# Patient Record
Sex: MALE | Race: WHITE | NOT HISPANIC OR LATINO | URBAN - METROPOLITAN AREA
[De-identification: names, ages, dates, MRNs, and addresses within clinical notes are randomized per-mention and may not be internally consistent; named-entity substitution may affect disease eponyms.]

---

## 2023-09-17 PROBLEM — M17.10 PATELLOFEMORAL ARTHRITIS: Status: ACTIVE | Noted: 2023-09-17

## 2023-09-17 PROBLEM — M47.816 DJD (DEGENERATIVE JOINT DISEASE), LUMBAR: Status: ACTIVE | Noted: 2023-09-17

## 2023-09-17 PROBLEM — M51.36 DDD (DEGENERATIVE DISC DISEASE), LUMBAR: Status: ACTIVE | Noted: 2023-09-17

## 2023-09-17 PROBLEM — K76.89 LIVER CYST: Status: ACTIVE | Noted: 2023-09-17

## 2023-09-17 PROBLEM — E55.9 VITAMIN D DEFICIENCY: Status: ACTIVE | Noted: 2023-09-17

## 2023-09-17 PROBLEM — M47.814 DJD (DEGENERATIVE JOINT DISEASE), THORACIC: Status: ACTIVE | Noted: 2023-09-17

## 2023-09-17 PROBLEM — I10 DIASTOLIC HYPERTENSION: Status: ACTIVE | Noted: 2023-09-17

## 2023-09-17 PROBLEM — N20.0 RENAL CALCULI: Status: ACTIVE | Noted: 2023-09-17

## 2023-09-17 PROBLEM — M50.30 DDD (DEGENERATIVE DISC DISEASE), CERVICAL: Status: ACTIVE | Noted: 2023-09-17

## 2023-09-17 PROBLEM — F41.1 GENERALIZED ANXIETY DISORDER: Status: ACTIVE | Noted: 2023-09-17

## 2023-09-17 PROBLEM — M51.369 DDD (DEGENERATIVE DISC DISEASE), LUMBAR: Status: ACTIVE | Noted: 2023-09-17

## 2023-09-17 PROBLEM — N52.9 ERECTILE DYSFUNCTION: Status: ACTIVE | Noted: 2023-09-17

## 2023-09-17 PROBLEM — D17.1 LIPOMA OF ABDOMINAL WALL: Status: ACTIVE | Noted: 2023-09-17

## 2023-09-17 PROBLEM — H93.12 LEFT-SIDED TINNITUS: Status: ACTIVE | Noted: 2023-09-17

## 2023-09-17 PROBLEM — N28.1 RENAL CYSTS, ACQUIRED, BILATERAL: Status: ACTIVE | Noted: 2023-09-17

## 2023-09-17 PROBLEM — E78.00 PURE HYPERCHOLESTEROLEMIA: Status: ACTIVE | Noted: 2023-09-17

## 2023-09-17 PROBLEM — F34.1 DYSTHYMIA: Status: ACTIVE | Noted: 2023-09-17

## 2023-09-17 PROBLEM — M17.12 PRIMARY OSTEOARTHRITIS OF LEFT KNEE: Status: ACTIVE | Noted: 2023-09-17

## 2023-09-17 PROBLEM — M43.17 SPONDYLOLISTHESIS AT L5-S1 LEVEL: Status: ACTIVE | Noted: 2023-09-17

## 2023-09-17 RX ORDER — ERGOCALCIFEROL 1.25 MG/1
50000 CAPSULE ORAL
COMMUNITY
Start: 2021-04-23 | End: 2024-01-03 | Stop reason: ALTCHOICE

## 2023-09-17 RX ORDER — NAPROXEN 500 MG/1
500 TABLET ORAL 2 TIMES DAILY
COMMUNITY
End: 2024-01-03 | Stop reason: ALTCHOICE

## 2023-09-17 RX ORDER — ONDANSETRON 4 MG/1
4 TABLET, ORALLY DISINTEGRATING ORAL EVERY 6 HOURS PRN
COMMUNITY
End: 2024-01-03 | Stop reason: ALTCHOICE

## 2023-09-17 RX ORDER — SILDENAFIL CITRATE 20 MG/1
20-60 TABLET ORAL DAILY PRN
COMMUNITY
Start: 2020-02-11

## 2023-09-17 RX ORDER — CITALOPRAM 40 MG/1
40 TABLET, FILM COATED ORAL DAILY
COMMUNITY

## 2024-01-02 PROBLEM — D17.1 LIPOMA OF ABDOMINAL WALL: Status: RESOLVED | Noted: 2023-09-17 | Resolved: 2024-01-02

## 2024-01-03 ENCOUNTER — OFFICE VISIT (OUTPATIENT)
Dept: PRIMARY CARE | Facility: CLINIC | Age: 69
End: 2024-01-03
Payer: MEDICARE

## 2024-01-03 VITALS
HEART RATE: 66 BPM | OXYGEN SATURATION: 97 % | BODY MASS INDEX: 29.41 KG/M2 | SYSTOLIC BLOOD PRESSURE: 122 MMHG | HEIGHT: 70 IN | DIASTOLIC BLOOD PRESSURE: 70 MMHG | TEMPERATURE: 98.4 F | WEIGHT: 205.4 LBS

## 2024-01-03 DIAGNOSIS — Z12.11 SCREEN FOR COLON CANCER: ICD-10-CM

## 2024-01-03 DIAGNOSIS — Z00.00 ROUTINE GENERAL MEDICAL EXAMINATION AT HEALTH CARE FACILITY: Primary | ICD-10-CM

## 2024-01-03 DIAGNOSIS — Z12.5 SCREENING FOR PROSTATE CANCER: ICD-10-CM

## 2024-01-03 DIAGNOSIS — K21.9 GERD WITHOUT ESOPHAGITIS: ICD-10-CM

## 2024-01-03 DIAGNOSIS — Z23 ENCOUNTER FOR IMMUNIZATION: ICD-10-CM

## 2024-01-03 DIAGNOSIS — E55.9 VITAMIN D DEFICIENCY: ICD-10-CM

## 2024-01-03 DIAGNOSIS — E78.00 PURE HYPERCHOLESTEROLEMIA: ICD-10-CM

## 2024-01-03 PROCEDURE — 1036F TOBACCO NON-USER: CPT | Performed by: FAMILY MEDICINE

## 2024-01-03 PROCEDURE — 99215 OFFICE O/P EST HI 40 MIN: CPT | Performed by: FAMILY MEDICINE

## 2024-01-03 PROCEDURE — 3074F SYST BP LT 130 MM HG: CPT | Performed by: FAMILY MEDICINE

## 2024-01-03 PROCEDURE — 1126F AMNT PAIN NOTED NONE PRSNT: CPT | Performed by: FAMILY MEDICINE

## 2024-01-03 PROCEDURE — G0009 ADMIN PNEUMOCOCCAL VACCINE: HCPCS | Performed by: FAMILY MEDICINE

## 2024-01-03 PROCEDURE — 1170F FXNL STATUS ASSESSED: CPT | Performed by: FAMILY MEDICINE

## 2024-01-03 PROCEDURE — G0439 PPPS, SUBSEQ VISIT: HCPCS | Performed by: FAMILY MEDICINE

## 2024-01-03 PROCEDURE — 3078F DIAST BP <80 MM HG: CPT | Performed by: FAMILY MEDICINE

## 2024-01-03 PROCEDURE — 1160F RVW MEDS BY RX/DR IN RCRD: CPT | Performed by: FAMILY MEDICINE

## 2024-01-03 PROCEDURE — 1159F MED LIST DOCD IN RCRD: CPT | Performed by: FAMILY MEDICINE

## 2024-01-03 ASSESSMENT — ACTIVITIES OF DAILY LIVING (ADL)
MANAGING FINANCES: INDEPENDENT
BATHING: INDEPENDENT
USING TELEPHONE: INDEPENDENT
USING TRANSPORTATION: INDEPENDENT
GROCERY_SHOPPING: INDEPENDENT
GROCERY SHOPPING: INDEPENDENT
JUDGMENT_ADEQUATE_SAFELY_COMPLETE_DAILY_ACTIVITIES: YES
TAKING MEDICATION: INDEPENDENT
DRESSING: INDEPENDENT
DOING_HOUSEWORK: INDEPENDENT
TOILETING: INDEPENDENT
DOING HOUSEWORK: INDEPENDENT
EATING: INDEPENDENT
PILL BOX USED: NO
FEEDING: INDEPENDENT
ADEQUATE_TO_COMPLETE_ADL: YES
NEEDS ASSISTANCE WITH FOOD: INDEPENDENT
DRESSING: INDEPENDENT
MANAGING_FINANCES: INDEPENDENT
STIL DRIVING: YES
PREPARING MEALS: INDEPENDENT
BATHING: INDEPENDENT
TAKING_MEDICATION: INDEPENDENT

## 2024-01-03 ASSESSMENT — PATIENT HEALTH QUESTIONNAIRE - PHQ9
SUM OF ALL RESPONSES TO PHQ9 QUESTIONS 1 AND 2: 0
1. LITTLE INTEREST OR PLEASURE IN DOING THINGS: NOT AT ALL
2. FEELING DOWN, DEPRESSED OR HOPELESS: NOT AT ALL

## 2024-01-03 ASSESSMENT — PAIN SCALES - GENERAL: PAINLEVEL: 0-NO PAIN

## 2024-01-03 NOTE — PATIENT INSTRUCTIONS
Here for physical.  Order for blood work.  Referral to colonoscopy and EGD.  Recommend pneumonia vaccine.      I recommend that you get the shingrix vaccine for shingles prevention. Shingles vaccination requires a 2 shots series divided by 2 to 6 months. Please get at the pharmacy.

## 2024-01-03 NOTE — PROGRESS NOTES
"Subjective   Reason for Visit: Sudeep Tamez is an 68 y.o. male here for a Medicare Wellness visit.     Past Medical, Surgical, and Family History reviewed and updated in chart.    Reviewed all medications by prescribing practitioner or clinical pharmacist (such as prescriptions, OTCs, herbal therapies and supplements) and documented in the medical record.    Still working part time.  Overall doing well.  Pt is having heart burn - requesting EGD.  Due for colonoscopy.          Patient Care Team:  Rosalio Skinner DO as PCP - General (Family Medicine)  Rosalio Skinner DO as PCP - O Medicare Advantage PCP     Review of Systems    Objective   Vitals:  /70 (BP Location: Right arm, Patient Position: Sitting, BP Cuff Size: Small adult)   Pulse 66   Temp 36.9 °C (98.4 °F)   Ht 1.778 m (5' 10\")   Wt 93.2 kg (205 lb 6.4 oz)   SpO2 97%   BMI 29.47 kg/m²       Physical Exam  Vitals reviewed.   Constitutional:       General: He is not in acute distress.  Cardiovascular:      Rate and Rhythm: Normal rate and regular rhythm.   Pulmonary:      Effort: Pulmonary effort is normal.      Breath sounds: No wheezing or rhonchi.   Musculoskeletal:      Right lower leg: No edema.      Left lower leg: No edema.   Lymphadenopathy:      Cervical: No cervical adenopathy.   Neurological:      Mental Status: He is alert.         Assessment/Plan   Problem List Items Addressed This Visit       Pure hypercholesterolemia    Vitamin D deficiency     Other Visit Diagnoses       Routine general medical examination at health care facility    -  Primary    Relevant Orders    1 Year Follow Up In Primary Care - Wellness Exam    Screen for colon cancer        Screening for prostate cancer        GERD without esophagitis        Encounter for immunization              Patient Instructions   Here for physical.  Order for blood work.  Referral to colonoscopy and EGD.  Recommend pneumonia vaccine.      I recommend that you get the shingrix " vaccine for shingles prevention. Shingles vaccination requires a 2 shots series divided by 2 to 6 months. Please get at the pharmacy.

## 2024-01-12 ENCOUNTER — LAB (OUTPATIENT)
Dept: LAB | Facility: LAB | Age: 69
End: 2024-01-12
Payer: MEDICARE

## 2024-01-12 DIAGNOSIS — Z00.00 ROUTINE GENERAL MEDICAL EXAMINATION AT HEALTH CARE FACILITY: ICD-10-CM

## 2024-01-12 DIAGNOSIS — Z12.5 SCREENING FOR PROSTATE CANCER: ICD-10-CM

## 2024-01-12 DIAGNOSIS — E55.9 VITAMIN D DEFICIENCY: ICD-10-CM

## 2024-01-12 DIAGNOSIS — E78.00 PURE HYPERCHOLESTEROLEMIA: ICD-10-CM

## 2024-01-12 DIAGNOSIS — K21.9 GERD WITHOUT ESOPHAGITIS: ICD-10-CM

## 2024-01-12 LAB
ALBUMIN SERPL BCP-MCNC: 4.3 G/DL (ref 3.4–5)
ALP SERPL-CCNC: 70 U/L (ref 33–136)
ALT SERPL W P-5'-P-CCNC: 12 U/L (ref 10–52)
ANION GAP SERPL CALC-SCNC: 12 MMOL/L (ref 10–20)
AST SERPL W P-5'-P-CCNC: 17 U/L (ref 9–39)
BASOPHILS # BLD AUTO: 0.05 X10*3/UL (ref 0–0.1)
BASOPHILS NFR BLD AUTO: 0.9 %
BILIRUB SERPL-MCNC: 0.3 MG/DL (ref 0–1.2)
BUN SERPL-MCNC: 19 MG/DL (ref 6–23)
CALCIUM SERPL-MCNC: 9.7 MG/DL (ref 8.6–10.3)
CHLORIDE SERPL-SCNC: 101 MMOL/L (ref 98–107)
CHOLEST SERPL-MCNC: 233 MG/DL (ref 0–199)
CHOLESTEROL/HDL RATIO: 4.2
CO2 SERPL-SCNC: 30 MMOL/L (ref 21–32)
CREAT SERPL-MCNC: 1.05 MG/DL (ref 0.5–1.3)
EGFRCR SERPLBLD CKD-EPI 2021: 77 ML/MIN/1.73M*2
EOSINOPHIL # BLD AUTO: 0.14 X10*3/UL (ref 0–0.7)
EOSINOPHIL NFR BLD AUTO: 2.6 %
ERYTHROCYTE [DISTWIDTH] IN BLOOD BY AUTOMATED COUNT: 12.4 % (ref 11.5–14.5)
GLUCOSE SERPL-MCNC: 90 MG/DL (ref 74–99)
HCT VFR BLD AUTO: 44.5 % (ref 41–52)
HDLC SERPL-MCNC: 54.9 MG/DL
HGB BLD-MCNC: 14.4 G/DL (ref 13.5–17.5)
IMM GRANULOCYTES # BLD AUTO: 0.02 X10*3/UL (ref 0–0.7)
IMM GRANULOCYTES NFR BLD AUTO: 0.4 % (ref 0–0.9)
LDLC SERPL CALC-MCNC: 165 MG/DL
LYMPHOCYTES # BLD AUTO: 1.73 X10*3/UL (ref 1.2–4.8)
LYMPHOCYTES NFR BLD AUTO: 32.3 %
MCH RBC QN AUTO: 30.8 PG (ref 26–34)
MCHC RBC AUTO-ENTMCNC: 32.4 G/DL (ref 32–36)
MCV RBC AUTO: 95 FL (ref 80–100)
MONOCYTES # BLD AUTO: 0.4 X10*3/UL (ref 0.1–1)
MONOCYTES NFR BLD AUTO: 7.5 %
NEUTROPHILS # BLD AUTO: 3.01 X10*3/UL (ref 1.2–7.7)
NEUTROPHILS NFR BLD AUTO: 56.3 %
NON HDL CHOLESTEROL: 178 MG/DL (ref 0–149)
NRBC BLD-RTO: 0 /100 WBCS (ref 0–0)
PLATELET # BLD AUTO: 347 X10*3/UL (ref 150–450)
POTASSIUM SERPL-SCNC: 4.4 MMOL/L (ref 3.5–5.3)
PROT SERPL-MCNC: 7.1 G/DL (ref 6.4–8.2)
RBC # BLD AUTO: 4.67 X10*6/UL (ref 4.5–5.9)
SODIUM SERPL-SCNC: 139 MMOL/L (ref 136–145)
TRIGL SERPL-MCNC: 64 MG/DL (ref 0–149)
VLDL: 13 MG/DL (ref 0–40)
WBC # BLD AUTO: 5.4 X10*3/UL (ref 4.4–11.3)

## 2024-01-12 PROCEDURE — 82306 VITAMIN D 25 HYDROXY: CPT

## 2024-01-12 PROCEDURE — G0103 PSA SCREENING: HCPCS

## 2024-01-12 PROCEDURE — 80061 LIPID PANEL: CPT

## 2024-01-12 PROCEDURE — 36415 COLL VENOUS BLD VENIPUNCTURE: CPT

## 2024-01-12 PROCEDURE — 85025 COMPLETE CBC W/AUTO DIFF WBC: CPT

## 2024-01-12 PROCEDURE — 80053 COMPREHEN METABOLIC PANEL: CPT

## 2024-01-13 LAB
25(OH)D3 SERPL-MCNC: 19 NG/ML (ref 30–100)
PSA SERPL-MCNC: 4.63 NG/ML

## 2024-01-15 DIAGNOSIS — E78.00 PURE HYPERCHOLESTEROLEMIA: ICD-10-CM

## 2024-01-15 DIAGNOSIS — E55.9 VITAMIN D DEFICIENCY: Primary | ICD-10-CM

## 2024-01-15 DIAGNOSIS — R97.20 ELEVATED PSA: ICD-10-CM

## 2024-01-15 RX ORDER — ERGOCALCIFEROL 1.25 MG/1
1.25 CAPSULE ORAL
Qty: 12 CAPSULE | Refills: 3 | Status: SHIPPED | OUTPATIENT
Start: 2024-01-15 | End: 2025-01-14

## 2024-03-28 ENCOUNTER — APPOINTMENT (OUTPATIENT)
Dept: GASTROENTEROLOGY | Facility: CLINIC | Age: 69
End: 2024-03-28
Payer: MEDICARE

## 2024-04-24 NOTE — H&P (VIEW-ONLY)
History Of Present Illness  Sudeep Tamez is a 68 y.o. male presenting to GI clinic with a chief complaint of  GERD.    Pt here to establish care, he states his last colonoscopy and EGD was . He has heartburn almost every day. He takes OTC Tums, used to give him more relief in the past. He states the heartburn is worse at night sometimes and at times he can get it from drinking water. Does not matter what he eats, he still gets heartburn. Juice gives him heartburn. The heartburn sometimes wakes him up in the middle of the night.  He does have some trigger foods for symptoms including juice, peppers. Patient had one episode of dysphagia awhile ago, a piece of steak got stuck in the middle of his throat and took awhile to go down.    He drinks a lot of coffee, especially in the morning. He was drinking decaf for awhile but switched back to regular coffee. Drinks 3-5 cups of coffee daily.     Pt denies changes in bowel habits, states Bms are regular. No blood in stool or when wiping.  He has some abdominal pain in the morning sometimes after he starts drinking his coffee. It goes away on it's own.     Social History  He reports that he has never smoked. He has never used smokeless tobacco. He reports current alcohol use. He reports that he does not currently use drugs.  He does not take NSAIDs on a regular basis      Family History  Family History   Problem Relation Name Age of Onset    Other (fell broke her hip) Mother      Mental illness Mother      Hypertension Mother      Dementia Mother      Mental illness Father      Paranoid behavior Father      Dementia Father      Other (complication from broken hip) Father      Benign prostatic hyperplasia Father      Drug abuse Sister          chemical dependency - sister passed from overdose    Other (benign ovarian tumor) Daughter      Dementia Mother's Brother      Diabetes Paternal Grandmother      Other (? in sleep thought to be MI) Paternal Grandmother       "Cancer Paternal Grandfather      Lung cancer Paternal Grandfather          (smoker)    Mental illness Sibling       The patient does have a FH of CRC. he does not have a FH of IBD    Review of Systems   Constitutional:  Negative for appetite change, chills, diaphoresis, fatigue, fever and unexpected weight change.   HENT:  Positive for trouble swallowing.    Gastrointestinal:  Positive for abdominal pain. Negative for abdominal distention, anal bleeding, blood in stool, constipation, diarrhea, nausea, rectal pain and vomiting.        See HPI   All other systems reviewed and are negative.        Physical Exam  Constitutional:       Appearance: He is obese.   HENT:      Head: Normocephalic and atraumatic.   Eyes:      Conjunctiva/sclera: Conjunctivae normal.      Pupils: Pupils are equal, round, and reactive to light.   Pulmonary:      Effort: Pulmonary effort is normal.   Abdominal:      General: Bowel sounds are normal. There is no distension.      Palpations: Abdomen is soft. There is no mass.      Tenderness: There is no abdominal tenderness. There is no guarding or rebound.      Hernia: No hernia is present.   Musculoskeletal:         General: Normal range of motion.      Cervical back: Normal range of motion.   Skin:     General: Skin is warm and dry.      Coloration: Skin is not jaundiced.   Neurological:      Mental Status: He is alert and oriented to person, place, and time. Mental status is at baseline.   Psychiatric:         Mood and Affect: Mood normal.         Behavior: Behavior normal.        Last Vital Signs  /82 (BP Location: Left arm, Patient Position: Sitting, BP Cuff Size: Adult)   Pulse 67   Temp 36.5 °C (97.7 °F) (Temporal)   Resp 15   Ht 1.778 m (5' 10\")   Wt 91.7 kg (202 lb 1.6 oz)   SpO2 97%   BMI 29.00 kg/m²      Relevant Results  Lab Results   Component Value Date    WBC 5.4 01/12/2024    HGB 14.4 01/12/2024    HCT 44.5 01/12/2024    MCV 95 01/12/2024     01/12/2024    " "  Lab Results   Component Value Date    GLUCOSE 90 01/12/2024    CALCIUM 9.7 01/12/2024     01/12/2024    K 4.4 01/12/2024    CO2 30 01/12/2024     01/12/2024    BUN 19 01/12/2024    CREATININE 1.05 01/12/2024      Lab Results   Component Value Date    ALT 12 01/12/2024    AST 17 01/12/2024    ALKPHOS 70 01/12/2024    BILITOT 0.3 01/12/2024    No results found for: \"IRON\", \"TIBC\", \"IRONSAT\", \"FERRITIN\", \"[id number]\", \"FOLATE\"    Lab Results   Component Value Date    CRP 0.3 04/22/2021    No results found for: \"LIPASE\"     EGD/COLONOSCOPY     Last EGD/Colonoscopy in 2011 per pt    IMAGING  === 09/03/15 ===  CT ABDOMEN PELVIS W WO CONTRAST  - Impression -  No urinary tract stones are identified.  No acute pathologic findings are identified.  MRI the kidneys with and without contrast is recommended to further evaluate the 1 cm hypodense lesion upper pole left kidney anteriorly. There is a probable mid renal cyst on the left as well.   Slightly irregular posterior wall urinary bladder at the base. There is clinical suspicion for possible bladder neoplasm further assessment with direct visualization may be appropriate.     Assessment/Plan   68 y.o. male presenting to GI clinic with a chief complaint of GERD, he is overdue for screening colonoscopy, has FH CRC.  Will start patient on pantoprazole daily.  EGD +/- dilation and colonoscopy with Dr. Viera or Jodee  Pantoprazole daily x3 months, ideally will wean off PPI at follow up appointment, pending EGD results  Follow up after EGD/Colonoscopy    Problem List Items Addressed This Visit    None  Visit Diagnoses       Screen for colon cancer        Relevant Orders    Colonoscopy Screening; High Risk Patient; FH CRC    GERD without esophagitis        Relevant Medications    pantoprazole (ProtoNix) 40 mg EC tablet    Other Relevant Orders    EGD            Albina Ledbetter, APRN-CNP  "

## 2024-04-24 NOTE — PROGRESS NOTES
History Of Present Illness  Sudeep Tamez is a 68 y.o. male presenting to GI clinic with a chief complaint of  GERD.    Pt here to establish care, he states his last colonoscopy and EGD was . He has heartburn almost every day. He takes OTC Tums, used to give him more relief in the past. He states the heartburn is worse at night sometimes and at times he can get it from drinking water. Does not matter what he eats, he still gets heartburn. Juice gives him heartburn. The heartburn sometimes wakes him up in the middle of the night.  He does have some trigger foods for symptoms including juice, peppers. Patient had one episode of dysphagia awhile ago, a piece of steak got stuck in the middle of his throat and took awhile to go down.    He drinks a lot of coffee, especially in the morning. He was drinking decaf for awhile but switched back to regular coffee. Drinks 3-5 cups of coffee daily.     Pt denies changes in bowel habits, states Bms are regular. No blood in stool or when wiping.  He has some abdominal pain in the morning sometimes after he starts drinking his coffee. It goes away on it's own.     Social History  He reports that he has never smoked. He has never used smokeless tobacco. He reports current alcohol use. He reports that he does not currently use drugs.  He does not take NSAIDs on a regular basis      Family History  Family History   Problem Relation Name Age of Onset    Other (fell broke her hip) Mother      Mental illness Mother      Hypertension Mother      Dementia Mother      Mental illness Father      Paranoid behavior Father      Dementia Father      Other (complication from broken hip) Father      Benign prostatic hyperplasia Father      Drug abuse Sister          chemical dependency - sister passed from overdose    Other (benign ovarian tumor) Daughter      Dementia Mother's Brother      Diabetes Paternal Grandmother      Other (? in sleep thought to be MI) Paternal Grandmother       "Cancer Paternal Grandfather      Lung cancer Paternal Grandfather          (smoker)    Mental illness Sibling       The patient does have a FH of CRC. he does not have a FH of IBD    Review of Systems   Constitutional:  Negative for appetite change, chills, diaphoresis, fatigue, fever and unexpected weight change.   HENT:  Positive for trouble swallowing.    Gastrointestinal:  Positive for abdominal pain. Negative for abdominal distention, anal bleeding, blood in stool, constipation, diarrhea, nausea, rectal pain and vomiting.        See HPI   All other systems reviewed and are negative.        Physical Exam  Constitutional:       Appearance: He is obese.   HENT:      Head: Normocephalic and atraumatic.   Eyes:      Conjunctiva/sclera: Conjunctivae normal.      Pupils: Pupils are equal, round, and reactive to light.   Pulmonary:      Effort: Pulmonary effort is normal.   Abdominal:      General: Bowel sounds are normal. There is no distension.      Palpations: Abdomen is soft. There is no mass.      Tenderness: There is no abdominal tenderness. There is no guarding or rebound.      Hernia: No hernia is present.   Musculoskeletal:         General: Normal range of motion.      Cervical back: Normal range of motion.   Skin:     General: Skin is warm and dry.      Coloration: Skin is not jaundiced.   Neurological:      Mental Status: He is alert and oriented to person, place, and time. Mental status is at baseline.   Psychiatric:         Mood and Affect: Mood normal.         Behavior: Behavior normal.        Last Vital Signs  /82 (BP Location: Left arm, Patient Position: Sitting, BP Cuff Size: Adult)   Pulse 67   Temp 36.5 °C (97.7 °F) (Temporal)   Resp 15   Ht 1.778 m (5' 10\")   Wt 91.7 kg (202 lb 1.6 oz)   SpO2 97%   BMI 29.00 kg/m²      Relevant Results  Lab Results   Component Value Date    WBC 5.4 01/12/2024    HGB 14.4 01/12/2024    HCT 44.5 01/12/2024    MCV 95 01/12/2024     01/12/2024    " "  Lab Results   Component Value Date    GLUCOSE 90 01/12/2024    CALCIUM 9.7 01/12/2024     01/12/2024    K 4.4 01/12/2024    CO2 30 01/12/2024     01/12/2024    BUN 19 01/12/2024    CREATININE 1.05 01/12/2024      Lab Results   Component Value Date    ALT 12 01/12/2024    AST 17 01/12/2024    ALKPHOS 70 01/12/2024    BILITOT 0.3 01/12/2024    No results found for: \"IRON\", \"TIBC\", \"IRONSAT\", \"FERRITIN\", \"[id number]\", \"FOLATE\"    Lab Results   Component Value Date    CRP 0.3 04/22/2021    No results found for: \"LIPASE\"     EGD/COLONOSCOPY     Last EGD/Colonoscopy in 2011 per pt    IMAGING  === 09/03/15 ===  CT ABDOMEN PELVIS W WO CONTRAST  - Impression -  No urinary tract stones are identified.  No acute pathologic findings are identified.  MRI the kidneys with and without contrast is recommended to further evaluate the 1 cm hypodense lesion upper pole left kidney anteriorly. There is a probable mid renal cyst on the left as well.   Slightly irregular posterior wall urinary bladder at the base. There is clinical suspicion for possible bladder neoplasm further assessment with direct visualization may be appropriate.     Assessment/Plan   68 y.o. male presenting to GI clinic with a chief complaint of GERD, he is overdue for screening colonoscopy, has FH CRC.  Will start patient on pantoprazole daily.  EGD +/- dilation and colonoscopy with Dr. Viera or Jodee  Pantoprazole daily x3 months, ideally will wean off PPI at follow up appointment, pending EGD results  Follow up after EGD/Colonoscopy    Problem List Items Addressed This Visit    None  Visit Diagnoses       Screen for colon cancer        Relevant Orders    Colonoscopy Screening; High Risk Patient; FH CRC    GERD without esophagitis        Relevant Medications    pantoprazole (ProtoNix) 40 mg EC tablet    Other Relevant Orders    EGD            Albina Ledbetter, APRN-CNP  "

## 2024-04-29 ENCOUNTER — OFFICE VISIT (OUTPATIENT)
Dept: GASTROENTEROLOGY | Facility: CLINIC | Age: 69
End: 2024-04-29
Payer: MEDICARE

## 2024-04-29 VITALS
SYSTOLIC BLOOD PRESSURE: 132 MMHG | HEART RATE: 67 BPM | DIASTOLIC BLOOD PRESSURE: 82 MMHG | RESPIRATION RATE: 15 BRPM | OXYGEN SATURATION: 97 % | TEMPERATURE: 97.7 F | WEIGHT: 202.1 LBS | HEIGHT: 70 IN | BODY MASS INDEX: 28.93 KG/M2

## 2024-04-29 DIAGNOSIS — Z12.11 SCREEN FOR COLON CANCER: ICD-10-CM

## 2024-04-29 DIAGNOSIS — Z12.11 SCREEN FOR COLON CANCER: Primary | ICD-10-CM

## 2024-04-29 DIAGNOSIS — K21.9 GERD WITHOUT ESOPHAGITIS: ICD-10-CM

## 2024-04-29 PROCEDURE — 1159F MED LIST DOCD IN RCRD: CPT

## 2024-04-29 PROCEDURE — 3079F DIAST BP 80-89 MM HG: CPT

## 2024-04-29 PROCEDURE — 3075F SYST BP GE 130 - 139MM HG: CPT

## 2024-04-29 PROCEDURE — 1160F RVW MEDS BY RX/DR IN RCRD: CPT

## 2024-04-29 PROCEDURE — 99204 OFFICE O/P NEW MOD 45 MIN: CPT

## 2024-04-29 PROCEDURE — 1036F TOBACCO NON-USER: CPT

## 2024-04-29 PROCEDURE — 1126F AMNT PAIN NOTED NONE PRSNT: CPT

## 2024-04-29 PROCEDURE — RXMED WILLOW AMBULATORY MEDICATION CHARGE

## 2024-04-29 RX ORDER — PANTOPRAZOLE SODIUM 40 MG/1
40 TABLET, DELAYED RELEASE ORAL DAILY
Qty: 30 TABLET | Refills: 2 | Status: SHIPPED | OUTPATIENT
Start: 2024-04-29 | End: 2024-05-20

## 2024-04-29 RX ORDER — SOD SULF/POT CHLORIDE/MAG SULF 1.479 G
TABLET ORAL
Qty: 24 TABLET | Refills: 0 | OUTPATIENT
Start: 2024-04-29 | End: 2024-05-20

## 2024-04-29 ASSESSMENT — ENCOUNTER SYMPTOMS
NAUSEA: 0
BLOOD IN STOOL: 0
VOMITING: 0
ROS GI COMMENTS: SEE HPI
FATIGUE: 0
FEVER: 0
CHILLS: 0
ABDOMINAL DISTENTION: 0
APPETITE CHANGE: 0
CONSTIPATION: 0
RECTAL PAIN: 0
UNEXPECTED WEIGHT CHANGE: 0
DIARRHEA: 0
DIAPHORESIS: 0
ANAL BLEEDING: 0
TROUBLE SWALLOWING: 1
ABDOMINAL PAIN: 1

## 2024-04-29 ASSESSMENT — PAIN SCALES - GENERAL: PAINLEVEL: 0-NO PAIN

## 2024-04-29 NOTE — PATIENT INSTRUCTIONS
Start pantoprazole once daily in the morning on an empty stomach 30 mins before breakfast  Schedule EGD/Colonoscopy    Studies have shown that lifestyle interventions are just as or MORE effective than taking medications for acid reflux.  Here are strategies to reduce acid reflux  -Maintain a healthy weight. Losing weight may be the single best way to decrease reflux.  -Stress can cause abdominal fullness or pain, and can lead to behaviors that trigger heartburn, such as overeating.  -Exercise can also help if done at least two hours after a meal.  Walking after a meal is very beneficial for GERD symptoms.  -Elevate head of bed 4 to 6 inches when sleeping  -Stay upright during and after eating  -Avoid lying down for 3 hours after meal and before bedtime  -Avoid clothing that is tight in the belly area  -Avoid spicy, fatty and fried foods; carbonated beverages, caffeine, garlic, onions; chocolate, peppermint and spearmint; acidic vegetables including peppers, tomatoes and tomato sauces; and citrus fruits  -Minimize alcohol use  -Do not use tobacco products, it will increase stomach acid production  -Avoid NSAIDs such as ibuprofen, Motrin, Advil, Aleve, naproxen, meloxicam, diclofenac, aspirin, Excedrin.  If you take NSAIDs on a regular basis, please discuss use with your primary doctor, as they may be contributing to issues that cause GERD     Please call the office if symptoms persist or worsen, or if you have questions regarding treatment.  Our office number is [phone]    Thank you,  ARANZA Anne, CNP

## 2024-05-03 ENCOUNTER — PHARMACY VISIT (OUTPATIENT)
Dept: PHARMACY | Facility: CLINIC | Age: 69
End: 2024-05-03
Payer: COMMERCIAL

## 2024-05-06 ENCOUNTER — PRE-ADMISSION TESTING (OUTPATIENT)
Dept: PREADMISSION TESTING | Facility: HOSPITAL | Age: 69
End: 2024-05-06
Payer: MEDICARE

## 2024-05-06 ENCOUNTER — ANESTHESIA EVENT (OUTPATIENT)
Dept: ANESTHESIOLOGY | Facility: HOSPITAL | Age: 69
End: 2024-05-06

## 2024-05-06 VITALS — BODY MASS INDEX: 28.7 KG/M2 | WEIGHT: 200 LBS

## 2024-05-06 ASSESSMENT — DUKE ACTIVITY SCORE INDEX (DASI)
CAN YOU DO HEAVY WORK AROUND THE HOUSE LIKE SCRUBBING FLOORS OR LIFTING AND MOVING HEAVY FURNITURE: YES
CAN YOU DO YARD WORK LIKE RAKING LEAVES, WEEDING OR PUSHING A MOWER: YES
CAN YOU TAKE CARE OF YOURSELF (EAT, DRESS, BATHE, OR USE TOILET): YES
CAN YOU CLIMB A FLIGHT OF STAIRS OR WALK UP A HILL: YES
DASI METS SCORE: 8.2
CAN YOU DO LIGHT WORK AROUND THE HOUSE LIKE DUSTING OR WASHING DISHES: YES
CAN YOU WALK INDOORS, SUCH AS AROUND YOUR HOUSE: YES
CAN YOU PARTICIPATE IN MODERATE RECREATIONAL ACTIVITIES LIKE GOLF, BOWLING, DANCING, DOUBLES TENNIS OR THROWING A BASEBALL OR FOOTBALL: NO
CAN YOU DO MODERATE WORK AROUND THE HOUSE LIKE VACUUMING, SWEEPING FLOORS OR CARRYING GROCERIES: YES
CAN YOU RUN A SHORT DISTANCE: YES
CAN YOU HAVE SEXUAL RELATIONS: YES
TOTAL_SCORE: 44.7
CAN YOU WALK A BLOCK OR TWO ON LEVEL GROUND: YES
CAN YOU PARTICIPATE IN STRENOUS SPORTS LIKE SWIMMING, SINGLES TENNIS, FOOTBALL, BASKETBALL, OR SKIING: NO

## 2024-05-06 NOTE — ANESTHESIA PREPROCEDURE EVALUATION
Patient: Sudeep Tamez    Procedure Information    Date: 05/06/24  Reason: PAT         Relevant Problems   Anesthesia (within normal limits)      Cardiac   (+) Diastolic hypertension   (+) Pure hypercholesterolemia      Pulmonary (within normal limits)      Neuro   (+) Dysthymia   (+) Generalized anxiety disorder      GI (within normal limits)      /Renal   (+) Renal calculi      Liver   (+) Liver cyst      Endocrine (within normal limits)      Hematology (within normal limits)      Musculoskeletal   (+) DDD (degenerative disc disease), cervical   (+) DDD (degenerative disc disease), lumbar   (+) DJD (degenerative joint disease), lumbar   (+) Primary osteoarthritis of left knee      HEENT (within normal limits)      ID (within normal limits)      Skin (within normal limits)      GYN (within normal limits)       Clinical information reviewed:                 Chart reviewed. No clearances ordered.    There were no vitals filed for this visit.    Past Surgical History:   Procedure Laterality Date    ANKLE SURGERY Left 2005    COLONOSCOPY      ESOPHAGOGASTRODUODENOSCOPY      LITHOTRIPSY      WISDOM TOOTH EXTRACTION       Past Medical History:   Diagnosis Date    Anxiety     Depression     GERD (gastroesophageal reflux disease)     Tinnitus     Left       Current Outpatient Medications:     calcium carbonate EX (Tums Extra Strength) 300 mg (750 mg) chewable tablet, Chew 300 mg if needed for indigestion or heartburn., Disp: , Rfl:     citalopram (CeleXA) 40 mg tablet, Take 1 tablet (40 mg) by mouth once daily., Disp: , Rfl:     ergocalciferol (Vitamin D-2) 1.25 MG (19932 UT) capsule, Take 1 capsule (1,250 mcg) by mouth 1 (one) time per week., Disp: 12 capsule, Rfl: 3    pantoprazole (ProtoNix) 40 mg EC tablet, Take 1 tablet (40 mg) by mouth once daily. Do not crush, chew, or split., Disp: 30 tablet, Rfl: 2    sildenafil (Revatio) 20 mg tablet, Take 1-3 tablets (20-60 mg) by mouth once daily as needed., Disp: , Rfl:      sod sulf-pot chloride-mag sulf (Sutab) 1.479-0.188- 0.225 gram tablet, Take 12 tabs the day before and 12 tabs the day of colonoscopy per included instructions. Take 1 tab every 1 to 2 minutes. Finish tabs and 16 oz of water within 20 mins. Finish prep per instructions., Disp: 24 tablet, Rfl: 0  Prior to Admission medications    Medication Sig Start Date End Date Taking? Authorizing Provider   calcium carbonate EX (Tums Extra Strength) 300 mg (750 mg) chewable tablet Chew 300 mg if needed for indigestion or heartburn.    Historical Provider, MD   citalopram (CeleXA) 40 mg tablet Take 1 tablet (40 mg) by mouth once daily.    Historical Provider, MD   ergocalciferol (Vitamin D-2) 1.25 MG (76226 UT) capsule Take 1 capsule (1,250 mcg) by mouth 1 (one) time per week. 1/15/24 1/14/25  Rosalio Skinner, DO   pantoprazole (ProtoNix) 40 mg EC tablet Take 1 tablet (40 mg) by mouth once daily. Do not crush, chew, or split. 4/29/24 7/28/24  ARANZA Cantu-CNP   sildenafil (Revatio) 20 mg tablet Take 1-3 tablets (20-60 mg) by mouth once daily as needed. 2/11/20   Historical Provider, MD   sod sulf-pot chloride-mag sulf (Sutab) 1.479-0.188- 0.225 gram tablet Take 12 tabs the day before and 12 tabs the day of colonoscopy per included instructions. Take 1 tab every 1 to 2 minutes. Finish tabs and 16 oz of water within 20 mins. Finish prep per instructions. 4/29/24   GALINA Cantu     Allergies   Allergen Reactions    Alprazolam Other     Fatigue     Bupropion Hcl Other     Worsened mood     Sertraline Hcl Rash     Social History     Tobacco Use    Smoking status: Never    Smokeless tobacco: Never   Substance Use Topics    Alcohol use: Yes     Comment: socially         Chemistry    Lab Results   Component Value Date/Time     01/12/2024 1157    K 4.4 01/12/2024 1157     01/12/2024 1157    CO2 30 01/12/2024 1157    BUN 19 01/12/2024 1157    CREATININE 1.05 01/12/2024 1157    Lab Results   Component  "Value Date/Time    CALCIUM 9.7 01/12/2024 1157    ALKPHOS 70 01/12/2024 1157    AST 17 01/12/2024 1157    ALT 12 01/12/2024 1157    BILITOT 0.3 01/12/2024 1157          Lab Results   Component Value Date/Time    WBC 5.4 01/12/2024 1157    HGB 14.4 01/12/2024 1157    HCT 44.5 01/12/2024 1157     01/12/2024 1157     No results found for: \"PROTIME\", \"PTT\", \"INR\"  No results found for this or any previous visit (from the past 4464 hour(s)).  No results found for this or any previous visit from the past 1095 days.        NPO Detail:  No data recorded     PHYSICAL EXAM    Anesthesia Plan  "

## 2024-05-06 NOTE — PREPROCEDURE INSTRUCTIONS
Medication List            Accurate as of May 6, 2024 11:42 AM. Always use your most recent med list.                calcium carbonate  mg (750 mg) chewable tablet  Commonly known as: Tums Extra Strength  Medication Adjustments for Surgery: Continue until night before surgery     citalopram 40 mg tablet  Commonly known as: CeleXA  Medication Adjustments for Surgery: Take morning of surgery with sip of water, no other fluids     ergocalciferol 1.25 MG (94273 UT) capsule  Commonly known as: Vitamin D-2  Take 1 capsule (1,250 mcg) by mouth 1 (one) time per week.  Medication Adjustments for Surgery: Continue until night before surgery     pantoprazole 40 mg EC tablet  Commonly known as: ProtoNix  Take 1 tablet (40 mg) by mouth once daily. Do not crush, chew, or split.  Medication Adjustments for Surgery: Continue until night before surgery     sildenafil 20 mg tablet  Commonly known as: Revatio  Medication Adjustments for Surgery: Continue until night before surgery     Sutab 1.479-0.188- 0.225 gram tablet  Generic drug: sod sulf-pot chloride-mag sulf  Take 12 tabs the day before and 12 tabs the day of colonoscopy per included instructions. Take 1 tab every 1 to 2 minutes. Finish tabs and 16 oz of water within 20 mins. Finish prep per instructions.  Medication Adjustments for Surgery: Take morning of surgery with sip of water, no other fluids                        NPO Instructions:     Follow instructions. Day before procedure - CLEAR LIQUIDS ONLY  -No dairy products, nothing red/purple.  Take first part of prep- 4pm & 5pm  Drink lots of liquids.  Day of Procedure- 5 hours before arrival - Take Second Part of Prep.     STOP DRINKING 3 HOURS PRIOR TO PROCEDURE.  Take medications as instructed.       Additional Instructions:      Review your medication instructions, take indicated medications  Wear  comfortable loose fitting clothing  All jewelry and valuables should be left at home     Park in back of hospital  by ER. Come up to Second floor-Outpt dept to check in.  Bring Photo ID and Insurance card,   You MUST have a  with you.       If you get ill at all before your procedure- CALL YOUR DOCTOR/SURGEON.  We want you in the best shape that is possible. Any sickness might lead to your procedure being delayed.       Call Outpatient dept at [phone] the night before your procedure (Friday for Monday procedure), between 1-3 pm.

## 2024-05-20 ENCOUNTER — HOSPITAL ENCOUNTER (OUTPATIENT)
Dept: GASTROENTEROLOGY | Facility: HOSPITAL | Age: 69
Setting detail: OUTPATIENT SURGERY
Discharge: HOME | End: 2024-05-20
Payer: MEDICARE

## 2024-05-20 ENCOUNTER — ANESTHESIA (OUTPATIENT)
Dept: GASTROENTEROLOGY | Facility: HOSPITAL | Age: 69
End: 2024-05-20
Payer: MEDICARE

## 2024-05-20 ENCOUNTER — ANESTHESIA EVENT (OUTPATIENT)
Dept: GASTROENTEROLOGY | Facility: HOSPITAL | Age: 69
End: 2024-05-20
Payer: MEDICARE

## 2024-05-20 VITALS
DIASTOLIC BLOOD PRESSURE: 84 MMHG | HEART RATE: 71 BPM | RESPIRATION RATE: 16 BRPM | WEIGHT: 200 LBS | HEIGHT: 70 IN | OXYGEN SATURATION: 94 % | BODY MASS INDEX: 28.63 KG/M2 | TEMPERATURE: 97.2 F | SYSTOLIC BLOOD PRESSURE: 130 MMHG

## 2024-05-20 DIAGNOSIS — K21.00 GASTROESOPHAGEAL REFLUX DISEASE WITH ESOPHAGITIS WITHOUT HEMORRHAGE: Primary | ICD-10-CM

## 2024-05-20 DIAGNOSIS — K21.9 GERD WITHOUT ESOPHAGITIS: ICD-10-CM

## 2024-05-20 DIAGNOSIS — Z12.11 SCREEN FOR COLON CANCER: ICD-10-CM

## 2024-05-20 PROCEDURE — 88305 TISSUE EXAM BY PATHOLOGIST: CPT | Mod: TC,91,GENLAB | Performed by: SURGERY

## 2024-05-20 PROCEDURE — 3700000002 HC GENERAL ANESTHESIA TIME - EACH INCREMENTAL 1 MINUTE

## 2024-05-20 PROCEDURE — 88341 IMHCHEM/IMCYTCHM EA ADD ANTB: CPT | Performed by: STUDENT IN AN ORGANIZED HEALTH CARE EDUCATION/TRAINING PROGRAM

## 2024-05-20 PROCEDURE — 43239 EGD BIOPSY SINGLE/MULTIPLE: CPT | Performed by: SURGERY

## 2024-05-20 PROCEDURE — 88342 IMHCHEM/IMCYTCHM 1ST ANTB: CPT | Performed by: STUDENT IN AN ORGANIZED HEALTH CARE EDUCATION/TRAINING PROGRAM

## 2024-05-20 PROCEDURE — G0105 COLORECTAL SCRN; HI RISK IND: HCPCS | Performed by: SURGERY

## 2024-05-20 PROCEDURE — 7100000009 HC PHASE TWO TIME - INITIAL BASE CHARGE

## 2024-05-20 PROCEDURE — 3700000001 HC GENERAL ANESTHESIA TIME - INITIAL BASE CHARGE

## 2024-05-20 PROCEDURE — 2500000004 HC RX 250 GENERAL PHARMACY W/ HCPCS (ALT 636 FOR OP/ED)

## 2024-05-20 PROCEDURE — 7100000010 HC PHASE TWO TIME - EACH INCREMENTAL 1 MINUTE

## 2024-05-20 PROCEDURE — 2500000004 HC RX 250 GENERAL PHARMACY W/ HCPCS (ALT 636 FOR OP/ED): Performed by: NURSE ANESTHETIST, CERTIFIED REGISTERED

## 2024-05-20 PROCEDURE — 45378 DIAGNOSTIC COLONOSCOPY: CPT | Performed by: SURGERY

## 2024-05-20 PROCEDURE — 88305 TISSUE EXAM BY PATHOLOGIST: CPT | Performed by: STUDENT IN AN ORGANIZED HEALTH CARE EDUCATION/TRAINING PROGRAM

## 2024-05-20 PROCEDURE — 88312 SPECIAL STAINS GROUP 1: CPT | Performed by: STUDENT IN AN ORGANIZED HEALTH CARE EDUCATION/TRAINING PROGRAM

## 2024-05-20 RX ORDER — PANTOPRAZOLE SODIUM 40 MG/1
40 TABLET, DELAYED RELEASE ORAL 2 TIMES DAILY
Qty: 60 TABLET | Refills: 3 | Status: SHIPPED | OUTPATIENT
Start: 2024-05-20

## 2024-05-20 RX ORDER — SUCRALFATE 1 G/1
1 TABLET ORAL
Qty: 120 TABLET | Refills: 2 | Status: SHIPPED | OUTPATIENT
Start: 2024-05-20

## 2024-05-20 RX ORDER — FENTANYL CITRATE 50 UG/ML
INJECTION, SOLUTION INTRAMUSCULAR; INTRAVENOUS AS NEEDED
Status: DISCONTINUED | OUTPATIENT
Start: 2024-05-20 | End: 2024-05-20

## 2024-05-20 RX ORDER — PROPOFOL 10 MG/ML
INJECTION, EMULSION INTRAVENOUS AS NEEDED
Status: DISCONTINUED | OUTPATIENT
Start: 2024-05-20 | End: 2024-05-20

## 2024-05-20 RX ORDER — SODIUM CHLORIDE, SODIUM LACTATE, POTASSIUM CHLORIDE, CALCIUM CHLORIDE 600; 310; 30; 20 MG/100ML; MG/100ML; MG/100ML; MG/100ML
20 INJECTION, SOLUTION INTRAVENOUS CONTINUOUS
Status: DISCONTINUED | OUTPATIENT
Start: 2024-05-20 | End: 2024-05-21 | Stop reason: HOSPADM

## 2024-05-20 RX ADMIN — FENTANYL CITRATE 25 MCG: 50 INJECTION, SOLUTION INTRAMUSCULAR; INTRAVENOUS at 10:22

## 2024-05-20 RX ADMIN — PROPOFOL 50 MG: 10 INJECTION, EMULSION INTRAVENOUS at 10:12

## 2024-05-20 RX ADMIN — PROPOFOL 50 MG: 10 INJECTION, EMULSION INTRAVENOUS at 10:26

## 2024-05-20 RX ADMIN — PROPOFOL 50 MG: 10 INJECTION, EMULSION INTRAVENOUS at 10:20

## 2024-05-20 RX ADMIN — SODIUM CHLORIDE, SODIUM LACTATE, POTASSIUM CHLORIDE, AND CALCIUM CHLORIDE: 600; 310; 30; 20 INJECTION, SOLUTION INTRAVENOUS at 09:50

## 2024-05-20 RX ADMIN — PROPOFOL 50 MG: 10 INJECTION, EMULSION INTRAVENOUS at 10:16

## 2024-05-20 RX ADMIN — PROPOFOL 100 MG: 10 INJECTION, EMULSION INTRAVENOUS at 09:55

## 2024-05-20 RX ADMIN — FENTANYL CITRATE 50 MCG: 50 INJECTION, SOLUTION INTRAMUSCULAR; INTRAVENOUS at 09:55

## 2024-05-20 RX ADMIN — PROPOFOL 50 MG: 10 INJECTION, EMULSION INTRAVENOUS at 10:00

## 2024-05-20 RX ADMIN — PROPOFOL 50 MG: 10 INJECTION, EMULSION INTRAVENOUS at 10:14

## 2024-05-20 RX ADMIN — SODIUM CHLORIDE, POTASSIUM CHLORIDE, SODIUM LACTATE AND CALCIUM CHLORIDE 20 ML/HR: 600; 310; 30; 20 INJECTION, SOLUTION INTRAVENOUS at 09:32

## 2024-05-20 RX ADMIN — PROPOFOL 50 MG: 10 INJECTION, EMULSION INTRAVENOUS at 10:04

## 2024-05-20 RX ADMIN — PROPOFOL 50 MG: 10 INJECTION, EMULSION INTRAVENOUS at 10:23

## 2024-05-20 RX ADMIN — FENTANYL CITRATE 25 MCG: 50 INJECTION, SOLUTION INTRAMUSCULAR; INTRAVENOUS at 10:00

## 2024-05-20 RX ADMIN — PROPOFOL 50 MG: 10 INJECTION, EMULSION INTRAVENOUS at 09:57

## 2024-05-20 RX ADMIN — PROPOFOL 50 MG: 10 INJECTION, EMULSION INTRAVENOUS at 10:09

## 2024-05-20 RX ADMIN — PROPOFOL 50 MG: 10 INJECTION, EMULSION INTRAVENOUS at 10:06

## 2024-05-20 SDOH — HEALTH STABILITY: MENTAL HEALTH: CURRENT SMOKER: 0

## 2024-05-20 ASSESSMENT — PAIN - FUNCTIONAL ASSESSMENT
PAIN_FUNCTIONAL_ASSESSMENT: 0-10
PAIN_FUNCTIONAL_ASSESSMENT: UNABLE TO SELF-REPORT

## 2024-05-20 ASSESSMENT — COLUMBIA-SUICIDE SEVERITY RATING SCALE - C-SSRS
6. HAVE YOU EVER DONE ANYTHING, STARTED TO DO ANYTHING, OR PREPARED TO DO ANYTHING TO END YOUR LIFE?: NO
1. IN THE PAST MONTH, HAVE YOU WISHED YOU WERE DEAD OR WISHED YOU COULD GO TO SLEEP AND NOT WAKE UP?: NO
2. HAVE YOU ACTUALLY HAD ANY THOUGHTS OF KILLING YOURSELF?: NO

## 2024-05-20 ASSESSMENT — PAIN SCALES - GENERAL
PAINLEVEL_OUTOF10: 2
PAINLEVEL_OUTOF10: 0 - NO PAIN

## 2024-05-20 NOTE — ANESTHESIA PREPROCEDURE EVALUATION
Patient: Sudeep Tamez    Procedure Information       Date/Time: 05/20/24 0935    Scheduled providers: José Miguel Viera MD    Procedures:       COLONOSCOPY      EGD    Location: [facility]            Relevant Problems   Cardiac   (+) Diastolic hypertension   (+) Pure hypercholesterolemia      Neuro   (+) Dysthymia   (+) Generalized anxiety disorder      /Renal   (+) Renal calculi      Liver   (+) Liver cyst      Musculoskeletal   (+) DDD (degenerative disc disease), cervical   (+) DDD (degenerative disc disease), lumbar   (+) DJD (degenerative joint disease), lumbar   (+) Primary osteoarthritis of left knee       Clinical information reviewed:   Tobacco  Allergies  Meds   Med Hx  Surg Hx   Fam Hx  Soc Hx        NPO Detail:  NPO/Void Status  Carbohydrate Drink Given Prior to Surgery? : N  Date of Last Liquid: 05/20/24  Time of Last Liquid: 0730  Date of Last Solid: 05/18/24  Time of Last Solid: 2100  Last Intake Type: Clear fluids  Time of Last Void: 0900         Physical Exam    Airway  Mallampati: II  TM distance: >3 FB  Neck ROM: full     Cardiovascular   Rhythm: regular  Rate: normal     Dental    Pulmonary   Breath sounds clear to auscultation     Abdominal   Abdomen: soft           Vitals:    05/20/24 0928   BP: 138/75   Resp: 16   Temp: 36.1 °C (97 °F)   SpO2: 96%       Past Surgical History:   Procedure Laterality Date    ANKLE SURGERY Left 2005    COLONOSCOPY      ESOPHAGOGASTRODUODENOSCOPY      LITHOTRIPSY      WISDOM TOOTH EXTRACTION       Past Medical History:   Diagnosis Date    Anxiety     Depression     GERD (gastroesophageal reflux disease)     Tinnitus     Left       Current Outpatient Medications:     calcium carbonate EX (Tums Extra Strength) 300 mg (750 mg) chewable tablet, Chew 300 mg if needed for indigestion or heartburn., Disp: , Rfl:     citalopram (CeleXA) 40 mg tablet, Take 1 tablet (40 mg) by mouth once daily., Disp: , Rfl:     ergocalciferol (Vitamin D-2) 1.25 MG  (34472 UT) capsule, Take 1 capsule (1,250 mcg) by mouth 1 (one) time per week., Disp: 12 capsule, Rfl: 3    pantoprazole (ProtoNix) 40 mg EC tablet, Take 1 tablet (40 mg) by mouth once daily. Do not crush, chew, or split., Disp: 30 tablet, Rfl: 2    sod sulf-pot chloride-mag sulf (Sutab) 1.479-0.188- 0.225 gram tablet, Take 12 tabs the day before and 12 tabs the day of colonoscopy per included instructions. Take 1 tab every 1 to 2 minutes. Finish tabs and 16 oz of water within 20 mins. Finish prep per instructions., Disp: 24 tablet, Rfl: 0    sildenafil (Revatio) 20 mg tablet, Take 1-3 tablets (20-60 mg) by mouth once daily as needed., Disp: , Rfl:     Current Facility-Administered Medications:     lactated Ringer's infusion, 20 mL/hr, intravenous, Continuous, Jaylyn Johnson PA-C, Last Rate: 20 mL/hr at 05/20/24 0932, 20 mL/hr at 05/20/24 0932  Prior to Admission medications    Medication Sig Start Date End Date Taking? Authorizing Provider   calcium carbonate EX (Tums Extra Strength) 300 mg (750 mg) chewable tablet Chew 300 mg if needed for indigestion or heartburn.   Yes Historical Provider, MD   citalopram (CeleXA) 40 mg tablet Take 1 tablet (40 mg) by mouth once daily.   Yes Historical Provider, MD   ergocalciferol (Vitamin D-2) 1.25 MG (57755 UT) capsule Take 1 capsule (1,250 mcg) by mouth 1 (one) time per week. 1/15/24 1/14/25 Yes Rosalio Skinner, DO   pantoprazole (ProtoNix) 40 mg EC tablet Take 1 tablet (40 mg) by mouth once daily. Do not crush, chew, or split. 4/29/24 7/28/24 Yes Albina Ledbetter, APRN-CNP   sod sulf-pot chloride-mag sulf (Sutab) 1.479-0.188- 0.225 gram tablet Take 12 tabs the day before and 12 tabs the day of colonoscopy per included instructions. Take 1 tab every 1 to 2 minutes. Finish tabs and 16 oz of water within 20 mins. Finish prep per instructions. 4/29/24  Yes ARANZA Cantu-CNP   sildenafil (Revatio) 20 mg tablet Take 1-3 tablets (20-60 mg) by mouth once daily as  "needed. 2/11/20   Historical Provider, MD     Allergies   Allergen Reactions    Alprazolam Other     Fatigue     Bupropion Hcl Other     Worsened mood     Sertraline Hcl Rash     Social History     Tobacco Use    Smoking status: Never    Smokeless tobacco: Never   Substance Use Topics    Alcohol use: Yes     Comment: socially         Chemistry    Lab Results   Component Value Date/Time     01/12/2024 1157    K 4.4 01/12/2024 1157     01/12/2024 1157    CO2 30 01/12/2024 1157    BUN 19 01/12/2024 1157    CREATININE 1.05 01/12/2024 1157    Lab Results   Component Value Date/Time    CALCIUM 9.7 01/12/2024 1157    ALKPHOS 70 01/12/2024 1157    AST 17 01/12/2024 1157    ALT 12 01/12/2024 1157    BILITOT 0.3 01/12/2024 1157          Lab Results   Component Value Date/Time    WBC 5.4 01/12/2024 1157    HGB 14.4 01/12/2024 1157    HCT 44.5 01/12/2024 1157     01/12/2024 1157     No results found for: \"PROTIME\", \"PTT\", \"INR\"  No results found for this or any previous visit (from the past 4464 hour(s)).  No results found for this or any previous visit from the past 1095 days.     Anesthesia Plan    History of general anesthesia?: yes  History of complications of general anesthesia?: no    ASA 2     MAC     The patient is not a current smoker.    intravenous induction   Anesthetic plan and risks discussed with patient.  Use of blood products discussed with patient who consented to blood products.    Plan discussed with attending.      "

## 2024-05-20 NOTE — ANESTHESIA POSTPROCEDURE EVALUATION
Patient: Sudeep Tamez    Procedure Summary       Date: 05/20/24 Room / Location: [facility]    Anesthesia Start: 0950 Anesthesia Stop: 1040    Procedures:       COLONOSCOPY      EGD Diagnosis:       Screen for colon cancer      GERD without esophagitis    Scheduled Providers: José Miguel Viera MD Responsible Provider: KADI Tabor    Anesthesia Type: MAC ASA Status: 2            Anesthesia Type: MAC    Vitals Value Taken Time   /84 05/20/24 1056   Temp 36 °C (96.8 °F) 05/20/24 1033   Pulse 71 05/20/24 1056   Resp 16 05/20/24 1056   SpO2 94 % 05/20/24 1056       Anesthesia Post Evaluation    Patient participation: complete - patient participated  Level of consciousness: awake and alert  Pain management: adequate  Airway patency: patent  Cardiovascular status: acceptable  Respiratory status: acceptable  Hydration status: acceptable  Postoperative Nausea and Vomiting: none        No notable events documented.

## 2024-05-21 ASSESSMENT — PAIN SCALES - GENERAL: PAINLEVEL_OUTOF10: 0 - NO PAIN

## 2024-06-04 LAB
LAB AP ASR DISCLAIMER: NORMAL
LABORATORY COMMENT REPORT: NORMAL
PATH REPORT.FINAL DX SPEC: NORMAL
PATH REPORT.GROSS SPEC: NORMAL
PATH REPORT.TOTAL CANCER: NORMAL

## 2024-06-06 ENCOUNTER — TELEPHONE (OUTPATIENT)
Dept: SURGERY | Facility: CLINIC | Age: 69
End: 2024-06-06
Payer: MEDICARE

## 2024-06-06 NOTE — TELEPHONE ENCOUNTER
This RN attempted to call patient to go over results that were sent to her by Dr. Viera. This RN left voicemail for patient to call bariatric office in order to go over the results. The contact information was provided for the patient.

## 2024-06-11 ENCOUNTER — OFFICE VISIT (OUTPATIENT)
Dept: GASTROENTEROLOGY | Facility: CLINIC | Age: 69
End: 2024-06-11
Payer: MEDICARE

## 2024-06-11 VITALS
WEIGHT: 198 LBS | SYSTOLIC BLOOD PRESSURE: 160 MMHG | HEIGHT: 70 IN | BODY MASS INDEX: 28.35 KG/M2 | TEMPERATURE: 97.3 F | RESPIRATION RATE: 16 BRPM | OXYGEN SATURATION: 97 % | HEART RATE: 97 BPM | DIASTOLIC BLOOD PRESSURE: 78 MMHG

## 2024-06-11 DIAGNOSIS — K22.70 BARRETT'S ESOPHAGUS WITHOUT DYSPLASIA: Primary | ICD-10-CM

## 2024-06-11 DIAGNOSIS — K21.9 GERD WITHOUT ESOPHAGITIS: ICD-10-CM

## 2024-06-11 PROCEDURE — 1159F MED LIST DOCD IN RCRD: CPT

## 2024-06-11 PROCEDURE — 3077F SYST BP >= 140 MM HG: CPT

## 2024-06-11 PROCEDURE — 99213 OFFICE O/P EST LOW 20 MIN: CPT

## 2024-06-11 PROCEDURE — 1036F TOBACCO NON-USER: CPT

## 2024-06-11 PROCEDURE — 1126F AMNT PAIN NOTED NONE PRSNT: CPT

## 2024-06-11 PROCEDURE — 3078F DIAST BP <80 MM HG: CPT

## 2024-06-11 ASSESSMENT — PATIENT HEALTH QUESTIONNAIRE - PHQ9
SUM OF ALL RESPONSES TO PHQ9 QUESTIONS 1 AND 2: 0
2. FEELING DOWN, DEPRESSED OR HOPELESS: NOT AT ALL
1. LITTLE INTEREST OR PLEASURE IN DOING THINGS: NOT AT ALL

## 2024-06-11 ASSESSMENT — ENCOUNTER SYMPTOMS: ROS GI COMMENTS: SEE HPI

## 2024-06-11 ASSESSMENT — PAIN SCALES - GENERAL: PAINLEVEL: 0-NO PAIN

## 2024-06-11 NOTE — PROGRESS NOTES
History Of Present Illness  Sudeep Tamez is a 68 y.o. male presenting to GI clinic for follow up.  He was last seen in 2024 with GERD complaints and dysphagia x1.  EGD and colonoscopy were ordered and performed on 2024 with Dr. Viera.  Today, he feels that he is doing better.  He no longer needs to use Tums and his acid reflux has significantly improved.  He does have occasional regurgitation for few minutes when laying down for bed at night.  Denies constipation, BRBPR, melena.    Social History  He reports that he has never smoked. He has never used smokeless tobacco. He reports current alcohol use. He reports that he does not currently use drugs.  He does not take NSAIDs on a regular basis    Family History  Family History   Problem Relation Name Age of Onset    Other (fell broke her hip) Mother      Mental illness Mother      Hypertension Mother      Dementia Mother      Mental illness Father      Paranoid behavior Father      Dementia Father      Other (complication from broken hip) Father      Benign prostatic hyperplasia Father      Drug abuse Sister          chemical dependency - sister passed from overdose    Other (benign ovarian tumor) Daughter      Dementia Mother's Brother Fei     Colon cancer Mother's Brother Fei     Colon cancer Maternal Grandmother      Diabetes Paternal Grandmother      Other (? in sleep thought to be MI) Paternal Grandmother      Cancer Paternal Grandfather      Lung cancer Paternal Grandfather          (smoker)    Mental illness Sibling       The patient does have a FH of CRC. he does not have a FH of IBD    Review of Systems   Gastrointestinal:         See HPI   All other systems reviewed and are negative.        Physical Exam  Constitutional:       Appearance: He is overweight.   HENT:      Head: Normocephalic and atraumatic.   Eyes:      Conjunctiva/sclera: Conjunctivae normal.      Pupils: Pupils are equal, round, and reactive to light.   Pulmonary:       "Effort: Pulmonary effort is normal.   Abdominal:      General: Bowel sounds are normal. There is no distension.      Palpations: Abdomen is soft. There is no mass.      Tenderness: There is no abdominal tenderness. There is no guarding or rebound.      Hernia: No hernia is present.   Musculoskeletal:         General: Normal range of motion.      Cervical back: Normal range of motion.   Skin:     General: Skin is warm and dry.      Coloration: Skin is not jaundiced.   Neurological:      Mental Status: He is alert and oriented to person, place, and time. Mental status is at baseline.   Psychiatric:         Mood and Affect: Mood normal.         Behavior: Behavior normal.          Last Vital Signs  Resp 16   Ht 1.778 m (5' 10\")   Wt 89.8 kg (198 lb)   BMI 28.41 kg/m²      Relevant Results  Lab Results   Component Value Date    WBC 5.4 01/12/2024    HGB 14.4 01/12/2024    HCT 44.5 01/12/2024    MCV 95 01/12/2024     01/12/2024      Lab Results   Component Value Date    GLUCOSE 90 01/12/2024    CALCIUM 9.7 01/12/2024     01/12/2024    K 4.4 01/12/2024    CO2 30 01/12/2024     01/12/2024    BUN 19 01/12/2024    CREATININE 1.05 01/12/2024      Lab Results   Component Value Date    ALT 12 01/12/2024    AST 17 01/12/2024    ALKPHOS 70 01/12/2024    BILITOT 0.3 01/12/2024    No results found for: \"IRON\", \"TIBC\", \"IRONSAT\", \"FERRITIN\", \"DYNBQYPM02\", \"FOLATE\"    Lab Results   Component Value Date    CRP 0.3 04/22/2021    No results found for: \"LIPASE\"   Lab Results   Component Value Date    HGBA1C 5.6 02/11/2020        EGD/COLONOSCOPY  EGD 5/20/24 with Dr. Viera- Findings  Severe, localized edematous, erythematous, friable, granular and ulcerated mucosa with erosion measuring 40 mm in the lower third of the esophagus and GE junction (32 cm from the incisors), consistent with Stubbs's esophagus; there was stigmata of recent hemorrhage; performed cold forceps biopsy;  Irregular Z-line 36 cm from the " incisors; performed cold forceps biopsy  Mild, localized erythematous and friable mucosa in the antrum, prepyloric region and duodenal bulb, consistent with gastritis; no bleeding was identified; performed 4 cold forceps biopsies to rule out H. pylori;  The upper third of the esophagus, middle third of the esophagus, fundus of the stomach, body of the stomach, 1st part of the duodenum and 2nd part of the duodenum appeared normal.  Hill grade 1    FINAL DIAGNOSIS   A. STOMACH ANTRUM BIOPSY:   --CHRONIC INACTIVE GASTRITIS, SEE NOTE  --NO MORPHOLOGIC EVIDENCE OF HELICOBACTER PYLORI-LIKE ORGANISMS     Note: An immunohistochemical stain for Helicobacter pylori is negative with appropriate control.      B. ESOPHAGUS DISTAL BIOPSY:   --COSTELLO'S MUCOSA, SEE NOTE  --DYSPLASIA OR MALIGNANCY ARE NOT IDENTIFIED     Note: Multiple deeper levels are examined.       C. ESOPHAGUS DISTAL BIOPSY:    --COSTELLO'S MUCOSA, AND ULCERATION, ACUTE AND CHRONIC INFLAMMATION WITH FIBRINOPURULENT EXUDATE, CONSISTENT WITH ULCER SITE, SEE NOTE  --DYSPLASIA OR MALIGNANCY ARE NOT IDENTIFIED     Note:  Immunohistochemical stains with appropriate controls are performed.   CMV: Negative for cytomegalovirus  HSV: Negative for herpes simplex virus  Pancytokeratin, AE1/AE3: Highlight desquamated cells  PAS: Negative for fungal hyphae     ___________________________________________________________________________     COLONOSCOPY 5/20/24 with Dr. Viera Findings  The ileocecal valve, cecum, ascending colon, hepatic flexure, transverse colon, splenic flexure, descending colon, sigmoid colon, rectosigmoid and rectum appeared normal.;     EGD/Colonoscopy in 2011 per pt      IMAGING  === 09/03/15 ===  CT ABDOMEN PELVIS W WO CONTRAST  - Impression -  No urinary tract stones are identified.  No acute pathologic findings are identified.  MRI the kidneys with and without contrast is recommended to further evaluate the 1 cm hypodense lesion upper pole left kidney  anteriorly. There is a probable mid renal cyst on the left as well.   Slightly irregular posterior wall urinary bladder at the base. There is clinical suspicion for possible bladder neoplasm further assessment with direct visualization may be appropriate.     Assessment/Plan   68 y.o. male presenting to GI clinic for GERD follow up. EGD and colonoscopy were ordered and performed on 5/20/2024 with Dr. Viera.  EGD showed evidence of Stubbs's esophagus, esophagitis, gastritis, and gastric ulcer.  Colonoscopy was unremarkable.     Continue PPI twice daily 2 minutes before meals, try to lower at follow-up visit to daily dosage, which he understands that he will need for life  Continue Carafate 4 times daily until gone  Repeat EGD for esophageal ulcer surveillance  Follow-up in 3 months    Problem List Items Addressed This Visit    None  Visit Diagnoses       Stubbs's esophagus without dysplasia    -  Primary    Relevant Orders    Esophagogastroduodenoscopy (EGD)    GERD without esophagitis                Albina Ledbetter, ARANZA-CNP

## 2024-06-25 DIAGNOSIS — K21.9 GERD WITHOUT ESOPHAGITIS: ICD-10-CM

## 2024-06-25 RX ORDER — PANTOPRAZOLE SODIUM 40 MG/1
40 TABLET, DELAYED RELEASE ORAL 2 TIMES DAILY
Qty: 180 TABLET | Refills: 0 | Status: SHIPPED | OUTPATIENT
Start: 2024-06-25 | End: 2024-09-23

## 2024-07-03 ENCOUNTER — APPOINTMENT (OUTPATIENT)
Dept: PRIMARY CARE | Facility: CLINIC | Age: 69
End: 2024-07-03
Payer: MEDICARE

## 2024-07-22 ENCOUNTER — OFFICE VISIT (OUTPATIENT)
Dept: PRIMARY CARE | Facility: CLINIC | Age: 69
End: 2024-07-22
Payer: MEDICARE

## 2024-07-22 VITALS
DIASTOLIC BLOOD PRESSURE: 68 MMHG | SYSTOLIC BLOOD PRESSURE: 112 MMHG | HEART RATE: 62 BPM | WEIGHT: 200.2 LBS | BODY MASS INDEX: 28.73 KG/M2 | OXYGEN SATURATION: 97 % | TEMPERATURE: 97.3 F

## 2024-07-22 DIAGNOSIS — E78.00 PURE HYPERCHOLESTEROLEMIA: ICD-10-CM

## 2024-07-22 DIAGNOSIS — K22.70 BARRETT'S ESOPHAGUS WITHOUT DYSPLASIA: Primary | ICD-10-CM

## 2024-07-22 DIAGNOSIS — Z12.5 SCREENING FOR PROSTATE CANCER: ICD-10-CM

## 2024-07-22 DIAGNOSIS — E55.9 VITAMIN D DEFICIENCY: ICD-10-CM

## 2024-07-22 PROCEDURE — 1036F TOBACCO NON-USER: CPT | Performed by: FAMILY MEDICINE

## 2024-07-22 PROCEDURE — 1126F AMNT PAIN NOTED NONE PRSNT: CPT | Performed by: FAMILY MEDICINE

## 2024-07-22 PROCEDURE — 99213 OFFICE O/P EST LOW 20 MIN: CPT | Performed by: FAMILY MEDICINE

## 2024-07-22 PROCEDURE — 3078F DIAST BP <80 MM HG: CPT | Performed by: FAMILY MEDICINE

## 2024-07-22 PROCEDURE — 3074F SYST BP LT 130 MM HG: CPT | Performed by: FAMILY MEDICINE

## 2024-07-22 PROCEDURE — 1159F MED LIST DOCD IN RCRD: CPT | Performed by: FAMILY MEDICINE

## 2024-07-22 ASSESSMENT — LIFESTYLE VARIABLES
AUDIT-C TOTAL SCORE: 3
SKIP TO QUESTIONS 9-10: 1
HOW OFTEN DO YOU HAVE SIX OR MORE DRINKS ON ONE OCCASION: NEVER
HOW OFTEN DO YOU HAVE A DRINK CONTAINING ALCOHOL: 2-3 TIMES A WEEK
HOW MANY STANDARD DRINKS CONTAINING ALCOHOL DO YOU HAVE ON A TYPICAL DAY: 1 OR 2

## 2024-07-22 ASSESSMENT — PAIN SCALES - GENERAL: PAINLEVEL: 0-NO PAIN

## 2024-07-22 ASSESSMENT — PATIENT HEALTH QUESTIONNAIRE - PHQ9
1. LITTLE INTEREST OR PLEASURE IN DOING THINGS: NOT AT ALL
2. FEELING DOWN, DEPRESSED OR HOPELESS: NOT AT ALL
SUM OF ALL RESPONSES TO PHQ9 QUESTIONS 1 & 2: 0

## 2024-07-22 ASSESSMENT — ENCOUNTER SYMPTOMS
OCCASIONAL FEELINGS OF UNSTEADINESS: 0
LOSS OF SENSATION IN FEET: 0
DEPRESSION: 0

## 2024-07-22 NOTE — PROGRESS NOTES
Subjective   Patient ID: Sudeep Tamez is a 69 y.o. male who presents for 6 MO F/U.    Barretts Esophagus  -Pt had EGD - pt diagnosed with barretts.  Pt is on pantoprazole.  Pt is also on carafate.  No further evaluation.      Elevated PSA  -No urinary issues.  Single elevated level - pt did not get blood work    Hyperlipidemia  -elevated last check.  Not on medications.               Review of Systems    Objective   /68 (BP Location: Right arm, Patient Position: Sitting)   Pulse 62   Temp 36.3 °C (97.3 °F) (Temporal)   Wt 90.8 kg (200 lb 3.2 oz)   SpO2 97%   BMI 28.73 kg/m²     Physical Exam  Vitals reviewed.   Constitutional:       General: He is not in acute distress.  Cardiovascular:      Rate and Rhythm: Normal rate and regular rhythm.   Pulmonary:      Effort: Pulmonary effort is normal.      Breath sounds: No wheezing or rhonchi.   Musculoskeletal:      Right lower leg: No edema.      Left lower leg: No edema.   Lymphadenopathy:      Cervical: No cervical adenopathy.   Neurological:      Mental Status: He is alert.         Assessment/Plan   Diagnoses and all orders for this visit:  Stubbs's esophagus without dysplasia  Screening for prostate cancer  Vitamin D deficiency  Pure hypercholesterolemia         Patient Instructions   For barretts esophagus - doing well on pantoprazole.      For elevated PSA - recheck levels    For elevated cholesterol - recheck levels    For vit D replacement - continue once weekly replacement - recheck levels.     Follow up in 6 months.

## 2024-07-22 NOTE — PATIENT INSTRUCTIONS
For barretts esophagus - doing well on pantoprazole.      For elevated PSA - recheck levels    For elevated cholesterol - recheck levels    For vit D replacement - continue once weekly replacement - recheck levels.     Follow up in 6 months.

## 2024-08-12 DIAGNOSIS — F41.1 GENERALIZED ANXIETY DISORDER: Primary | ICD-10-CM

## 2024-08-12 RX ORDER — CITALOPRAM 40 MG/1
40 TABLET, FILM COATED ORAL DAILY
Qty: 90 TABLET | Refills: 1 | Status: SHIPPED | OUTPATIENT
Start: 2024-08-12 | End: 2025-08-12

## 2024-09-13 ENCOUNTER — APPOINTMENT (OUTPATIENT)
Dept: GASTROENTEROLOGY | Facility: CLINIC | Age: 69
End: 2024-09-13
Payer: MEDICARE

## 2024-10-03 ENCOUNTER — ANESTHESIA EVENT (OUTPATIENT)
Dept: GASTROENTEROLOGY | Facility: HOSPITAL | Age: 69
End: 2024-10-03
Payer: MEDICARE

## 2024-10-03 ENCOUNTER — PRE-ADMISSION TESTING (OUTPATIENT)
Dept: PREADMISSION TESTING | Facility: HOSPITAL | Age: 69
End: 2024-10-03
Payer: MEDICARE

## 2024-10-03 VITALS — WEIGHT: 195 LBS | HEIGHT: 71 IN | BODY MASS INDEX: 27.3 KG/M2

## 2024-10-03 PROBLEM — T88.4XXA DIFFICULT INTUBATION: Status: ACTIVE | Noted: 2024-10-03

## 2024-10-03 ASSESSMENT — DUKE ACTIVITY SCORE INDEX (DASI)
CAN YOU PARTICIPATE IN STRENOUS SPORTS LIKE SWIMMING, SINGLES TENNIS, FOOTBALL, BASKETBALL, OR SKIING: NO
TOTAL_SCORE: 44.7
CAN YOU HAVE SEXUAL RELATIONS: YES
CAN YOU DO LIGHT WORK AROUND THE HOUSE LIKE DUSTING OR WASHING DISHES: YES
DASI METS SCORE: 8.2
CAN YOU PARTICIPATE IN MODERATE RECREATIONAL ACTIVITIES LIKE GOLF, BOWLING, DANCING, DOUBLES TENNIS OR THROWING A BASEBALL OR FOOTBALL: NO
CAN YOU WALK A BLOCK OR TWO ON LEVEL GROUND: YES
CAN YOU WALK INDOORS, SUCH AS AROUND YOUR HOUSE: YES
CAN YOU CLIMB A FLIGHT OF STAIRS OR WALK UP A HILL: YES
CAN YOU DO HEAVY WORK AROUND THE HOUSE LIKE SCRUBBING FLOORS OR LIFTING AND MOVING HEAVY FURNITURE: YES
CAN YOU DO MODERATE WORK AROUND THE HOUSE LIKE VACUUMING, SWEEPING FLOORS OR CARRYING GROCERIES: YES
CAN YOU TAKE CARE OF YOURSELF (EAT, DRESS, BATHE, OR USE TOILET): YES
CAN YOU RUN A SHORT DISTANCE: YES
CAN YOU DO YARD WORK LIKE RAKING LEAVES, WEEDING OR PUSHING A MOWER: YES

## 2024-10-03 NOTE — PREPROCEDURE INSTRUCTIONS
Medication List            Accurate as of October 3, 2024  9:44 AM. Always use your most recent med list.                citalopram 40 mg tablet  Commonly known as: CeleXA  Take 1 tablet (40 mg) by mouth once daily.  Medication Adjustments for Surgery: Take on the morning of surgery     ergocalciferol 1.25 MG (92007 UT) capsule  Commonly known as: Vitamin D-2  Take 1 capsule (1,250 mcg) by mouth 1 (one) time per week.     pantoprazole 40 mg EC tablet  Commonly known as: ProtoNix  Take 1 tablet (40 mg) by mouth 2 times a day. Do not crush, chew, or split.  Medication Adjustments for Surgery: Take last dose 1 day (24 hours) before surgery     sildenafil 20 mg tablet  Commonly known as: Revatio  Medication Adjustments for Surgery: Take last dose 2 days before surgery     sucralfate 1 gram tablet  Commonly known as: Carafate  Take 1 tablet (1 g) by mouth 4 times a day before meals. Take 1 Tab by mouth on an empty stomach four times daily:  before meals and at bed time x 14 days.  May make into a slurry with small amount of water or dissolve in hot water or hot tea.  Medication Adjustments for Surgery: Take last dose 1 day (24 hours) before surgery                              NPO Instructions:    Do not eat any food after midnight the night before your surgery/procedure.  May have clears up to 3 hours preop. Water, Black coffee, tea, gatorade, and clear soda.   Remember to stop drinking by mouth 3 hours prior to procedure. No gum, candy, mints or smoking.      Additional Instructions:     Review your medication instructions, take indicated medications  Wear  comfortable loose fitting clothing  All jewelry and valuables should be left at home    Park in back of hospital by ER. Come up to Second floor-Outpt dept to check in.  Bring Photo ID and Insurance card,   You MUST have a  with you.  No more than 2 visitors with you please.      If you get ill at all before your procedure- CALL YOUR DOCTOR/SURGEON.  We want  you in the best shape that is possible. Any sickness might lead to your procedure being delayed.      Call Outpatient dept at [phone] the night before your procedure (Friday for Monday procedure), between 1-3 pm.

## 2024-10-03 NOTE — ANESTHESIA PREPROCEDURE EVALUATION
Patient: Sudepe Tamez    Procedure Information       Date/Time: 10/21/24 1420    Scheduled providers: José Miguel Viera MD    Procedure: EGD    Location: [facility]            Relevant Problems   Anesthesia (within normal limits)      Cardiac   (+) Diastolic hypertension   (+) Pure hypercholesterolemia      Pulmonary (within normal limits)      Neuro   (+) Generalized anxiety disorder      GI   (+) Gastroesophageal reflux disease without esophagitis      /Renal   (+) Renal calculi      Liver   (+) Liver cyst      Endocrine (within normal limits)      Hematology (within normal limits)      Musculoskeletal   (+) DDD (degenerative disc disease), cervical   (+) DDD (degenerative disc disease), lumbar   (+) Primary osteoarthritis of left knee      HEENT (within normal limits)      ID (within normal limits)      Skin (within normal limits)      GYN (within normal limits)     There were no vitals filed for this visit.    Past Surgical History:   Procedure Laterality Date    ANKLE SURGERY Left 2005    COLONOSCOPY      ESOPHAGOGASTRODUODENOSCOPY      LITHOTRIPSY      WISDOM TOOTH EXTRACTION       Past Medical History:   Diagnosis Date    Anxiety     Depression     GERD (gastroesophageal reflux disease)     Kidney stones     Tinnitus     Left       Current Outpatient Medications:     citalopram (CeleXA) 40 mg tablet, Take 1 tablet (40 mg) by mouth once daily., Disp: 90 tablet, Rfl: 1    ergocalciferol (Vitamin D-2) 1.25 MG (03487 UT) capsule, Take 1 capsule (1,250 mcg) by mouth 1 (one) time per week., Disp: 12 capsule, Rfl: 3    pantoprazole (ProtoNix) 40 mg EC tablet, Take 1 tablet (40 mg) by mouth 2 times a day. Do not crush, chew, or split., Disp: 180 tablet, Rfl: 0    sildenafil (Revatio) 20 mg tablet, Take 1-3 tablets (20-60 mg) by mouth once daily as needed., Disp: , Rfl:     sucralfate (Carafate) 1 gram tablet, Take 1 tablet (1 g) by mouth 4 times a day before meals. Take 1 Tab by mouth on an empty  stomach four times daily:  before meals and at bed time x 14 days.  May make into a slurry with small amount of water or dissolve in hot water or hot tea., Disp: 120 tablet, Rfl: 2  Prior to Admission medications    Medication Sig Start Date End Date Taking? Authorizing Provider   citalopram (CeleXA) 40 mg tablet Take 1 tablet (40 mg) by mouth once daily. 8/12/24 8/12/25  Rosalio Skinner, DO   ergocalciferol (Vitamin D-2) 1.25 MG (35231 UT) capsule Take 1 capsule (1,250 mcg) by mouth 1 (one) time per week. 1/15/24 1/14/25  Rosalio Skinner, DO   pantoprazole (ProtoNix) 40 mg EC tablet Take 1 tablet (40 mg) by mouth 2 times a day. Do not crush, chew, or split. 6/25/24 9/23/24  Albina Ledbetter, APRN-CNP   sildenafil (Revatio) 20 mg tablet Take 1-3 tablets (20-60 mg) by mouth once daily as needed. 2/11/20   Historical Provider, MD   sucralfate (Carafate) 1 gram tablet Take 1 tablet (1 g) by mouth 4 times a day before meals. Take 1 Tab by mouth on an empty stomach four times daily:  before meals and at bed time x 14 days.  May make into a slurry with small amount of water or dissolve in hot water or hot tea. 5/20/24   José Miguel Viera MD     Allergies   Allergen Reactions    Alprazolam Other     Fatigue     Bupropion Hcl Other     Worsened mood     Sertraline Hcl Rash     Social History     Tobacco Use    Smoking status: Never    Smokeless tobacco: Never   Substance Use Topics    Alcohol use: Yes     Comment: 2 x week         Chemistry    Lab Results   Component Value Date/Time     01/12/2024 1157    K 4.4 01/12/2024 1157     01/12/2024 1157    CO2 30 01/12/2024 1157    BUN 19 01/12/2024 1157    CREATININE 1.05 01/12/2024 1157    Lab Results   Component Value Date/Time    CALCIUM 9.7 01/12/2024 1157    ALKPHOS 70 01/12/2024 1157    AST 17 01/12/2024 1157    ALT 12 01/12/2024 1157    BILITOT 0.3 01/12/2024 1157          Lab Results   Component Value Date/Time    WBC 5.4 01/12/2024 1157    HGB 14.4 01/12/2024  "1157    HCT 44.5 01/12/2024 1157     01/12/2024 1157     No results found for: \"PROTIME\", \"PTT\", \"INR\"  No results found for this or any previous visit (from the past 4464 hour(s)).  No results found for this or any previous visit from the past 1095 days.    Clinical information reviewed:                 Chart reviewed.  No clearances ordered.        NPO Detail:  No data recorded     Physical Exam    Airway  Mallampati: II  TM distance: >3 FB  Neck ROM: full     Cardiovascular   Rhythm: regular  Rate: normal     Dental    Pulmonary   Breath sounds clear to auscultation     Abdominal          Vitals:    10/21/24 1151   BP: 136/75   Pulse: 68   Resp: 17   Temp: 36.6 °C (97.9 °F)   SpO2: 96%       Past Surgical History:   Procedure Laterality Date    ANKLE SURGERY Left 2005    COLONOSCOPY      ESOPHAGOGASTRODUODENOSCOPY      LITHOTRIPSY      WISDOM TOOTH EXTRACTION       Past Medical History:   Diagnosis Date    Anxiety     Depression     GERD (gastroesophageal reflux disease)     Kidney stones     Tinnitus     Left       Current Outpatient Medications:     citalopram (CeleXA) 40 mg tablet, Take 1 tablet (40 mg) by mouth once daily., Disp: 90 tablet, Rfl: 1    ergocalciferol (Vitamin D-2) 1.25 MG (57788 UT) capsule, Take 1 capsule (1,250 mcg) by mouth 1 (one) time per week., Disp: 12 capsule, Rfl: 3    pantoprazole (ProtoNix) 40 mg EC tablet, Take 1 tablet (40 mg) by mouth 2 times a day. Do not crush, chew, or split., Disp: 180 tablet, Rfl: 0    sildenafil (Revatio) 20 mg tablet, Take 1-3 tablets (20-60 mg) by mouth once daily as needed., Disp: , Rfl:     sucralfate (Carafate) 1 gram tablet, Take 1 tablet (1 g) by mouth 4 times a day before meals. Take 1 Tab by mouth on an empty stomach four times daily:  before meals and at bed time x 14 days.  May make into a slurry with small amount of water or dissolve in hot water or hot tea., Disp: 120 tablet, Rfl: 2  Prior to Admission medications    Medication Sig Start " "Date End Date Taking? Authorizing Provider   citalopram (CeleXA) 40 mg tablet Take 1 tablet (40 mg) by mouth once daily. 8/12/24 8/12/25 Yes Rosalio Skinner, DO   ergocalciferol (Vitamin D-2) 1.25 MG (22622 UT) capsule Take 1 capsule (1,250 mcg) by mouth 1 (one) time per week. 1/15/24 1/14/25 Yes Rosalio Skinner, DO   pantoprazole (ProtoNix) 40 mg EC tablet Take 1 tablet (40 mg) by mouth 2 times a day. Do not crush, chew, or split. 6/25/24 10/21/24 Yes Albina Ledbetter APRN-CNP   sildenafil (Revatio) 20 mg tablet Take 1-3 tablets (20-60 mg) by mouth once daily as needed. 2/11/20  Yes Historical Provider, MD   sucralfate (Carafate) 1 gram tablet Take 1 tablet (1 g) by mouth 4 times a day before meals. Take 1 Tab by mouth on an empty stomach four times daily:  before meals and at bed time x 14 days.  May make into a slurry with small amount of water or dissolve in hot water or hot tea. 5/20/24  Yes José Miguel Viera MD     Allergies   Allergen Reactions    Alprazolam Other     Fatigue     Bupropion Hcl Other     Worsened mood     Sertraline Hcl Rash     Social History     Tobacco Use    Smoking status: Never    Smokeless tobacco: Never   Substance Use Topics    Alcohol use: Yes     Comment: 2 drinks every couple days         Chemistry    Lab Results   Component Value Date/Time     01/12/2024 1157    K 4.4 01/12/2024 1157     01/12/2024 1157    CO2 30 01/12/2024 1157    BUN 19 01/12/2024 1157    CREATININE 1.05 01/12/2024 1157    Lab Results   Component Value Date/Time    CALCIUM 9.7 01/12/2024 1157    ALKPHOS 70 01/12/2024 1157    AST 17 01/12/2024 1157    ALT 12 01/12/2024 1157    BILITOT 0.3 01/12/2024 1157          Lab Results   Component Value Date/Time    WBC 5.4 01/12/2024 1157    HGB 14.4 01/12/2024 1157    HCT 44.5 01/12/2024 1157     01/12/2024 1157     No results found for: \"PROTIME\", \"PTT\", \"INR\"  No results found for this or any previous visit (from the past 4464 hours).  No results " found for this or any previous visit from the past 1095 days.   Anesthesia Plan    History of general anesthesia?: yes  History of complications of general anesthesia?: no    ASA 3     MAC     The patient is not a current smoker.    intravenous induction   Anesthetic plan and risks discussed with patient.  Use of blood products discussed with patient who consented to blood products.    Plan discussed with attending.

## 2024-10-21 ENCOUNTER — ANESTHESIA (OUTPATIENT)
Dept: GASTROENTEROLOGY | Facility: HOSPITAL | Age: 69
End: 2024-10-21
Payer: MEDICARE

## 2024-10-21 ENCOUNTER — HOSPITAL ENCOUNTER (OUTPATIENT)
Dept: GASTROENTEROLOGY | Facility: HOSPITAL | Age: 69
Setting detail: OUTPATIENT SURGERY
Discharge: HOME | End: 2024-10-21
Payer: MEDICARE

## 2024-10-21 VITALS
RESPIRATION RATE: 16 BRPM | HEART RATE: 71 BPM | TEMPERATURE: 97.3 F | DIASTOLIC BLOOD PRESSURE: 71 MMHG | SYSTOLIC BLOOD PRESSURE: 116 MMHG | WEIGHT: 195 LBS | HEIGHT: 71 IN | OXYGEN SATURATION: 93 % | BODY MASS INDEX: 27.3 KG/M2

## 2024-10-21 DIAGNOSIS — K22.70 BARRETT'S ESOPHAGUS WITHOUT DYSPLASIA: ICD-10-CM

## 2024-10-21 DIAGNOSIS — K21.9 GERD WITHOUT ESOPHAGITIS: ICD-10-CM

## 2024-10-21 PROCEDURE — 7100000009 HC PHASE TWO TIME - INITIAL BASE CHARGE

## 2024-10-21 PROCEDURE — 2500000004 HC RX 250 GENERAL PHARMACY W/ HCPCS (ALT 636 FOR OP/ED): Performed by: NURSE ANESTHETIST, CERTIFIED REGISTERED

## 2024-10-21 PROCEDURE — 43239 EGD BIOPSY SINGLE/MULTIPLE: CPT | Performed by: SURGERY

## 2024-10-21 PROCEDURE — 3700000001 HC GENERAL ANESTHESIA TIME - INITIAL BASE CHARGE

## 2024-10-21 PROCEDURE — 3700000002 HC GENERAL ANESTHESIA TIME - EACH INCREMENTAL 1 MINUTE

## 2024-10-21 PROCEDURE — 7100000010 HC PHASE TWO TIME - EACH INCREMENTAL 1 MINUTE

## 2024-10-21 RX ORDER — PROPOFOL 10 MG/ML
INJECTION, EMULSION INTRAVENOUS AS NEEDED
Status: DISCONTINUED | OUTPATIENT
Start: 2024-10-21 | End: 2024-10-21

## 2024-10-21 RX ORDER — FENTANYL CITRATE 50 UG/ML
INJECTION, SOLUTION INTRAMUSCULAR; INTRAVENOUS AS NEEDED
Status: DISCONTINUED | OUTPATIENT
Start: 2024-10-21 | End: 2024-10-21

## 2024-10-21 RX ORDER — PANTOPRAZOLE SODIUM 40 MG/1
40 TABLET, DELAYED RELEASE ORAL
Qty: 90 TABLET | Refills: 3 | Status: SHIPPED | OUTPATIENT
Start: 2024-10-21

## 2024-10-21 SDOH — HEALTH STABILITY: MENTAL HEALTH: CURRENT SMOKER: 0

## 2024-10-21 ASSESSMENT — PAIN - FUNCTIONAL ASSESSMENT
PAIN_FUNCTIONAL_ASSESSMENT: 0-10
PAIN_FUNCTIONAL_ASSESSMENT: 0-10

## 2024-10-21 ASSESSMENT — ENCOUNTER SYMPTOMS
CHILLS: 0
BLOOD IN STOOL: 0
FEVER: 0
ABDOMINAL PAIN: 0
SHORTNESS OF BREATH: 0
NAUSEA: 0
VOMITING: 0

## 2024-10-21 ASSESSMENT — PAIN SCALES - GENERAL
PAINLEVEL_OUTOF10: 0 - NO PAIN
PAINLEVEL_OUTOF10: 0 - NO PAIN

## 2024-10-21 NOTE — H&P
History Of Present Illness  Sudeep Tamez is a 69 y.o. male presenting for EGD. Last EGD performed May 2024 with findings of lower esophageal ulceration and erosion, biopsies positive for Stubbs's esophagus. Treated with PPI and carafate. Patient returns for EGD for surveillance of the area.  Patient finished the course of Carafate.  He continues on PPI and states he has no problems with heartburn recently.  He denies any epigastric pain, dysphagia.  Nausea vomiting.  He denies any bright red blood in his stool or melena.  Denies any recent acute illness, fevers, chills.  Patient reports drinking 3-4 drinks at the bar 2-3 times per week.  He denies smoking.     Past Medical History  Past Medical History:   Diagnosis Date    Anxiety     Depression     GERD (gastroesophageal reflux disease)     Kidney stones     Tinnitus     Left       Surgical History  Past Surgical History:   Procedure Laterality Date    ANKLE SURGERY Left     COLONOSCOPY      ESOPHAGOGASTRODUODENOSCOPY      LITHOTRIPSY      WISDOM TOOTH EXTRACTION          Social History  He reports that he has never smoked. He has never used smokeless tobacco. He reports current alcohol use. He reports that he does not use drugs.    Family History  Family History   Problem Relation Name Age of Onset    Other (fell broke her hip) Mother      Mental illness Mother      Hypertension Mother      Dementia Mother      Mental illness Father      Paranoid behavior Father      Dementia Father      Other (complication from broken hip) Father      Benign prostatic hyperplasia Father      Drug abuse Sister          chemical dependency - sister passed from overdose    Other (benign ovarian tumor) Daughter      Dementia Mother's Brother Fei     Colon cancer Mother's Brother Fei     Colon cancer Maternal Grandmother      Diabetes Paternal Grandmother      Other (? in sleep thought to be MI) Paternal Grandmother      Cancer Paternal Grandfather      Lung cancer  Paternal Grandfather          (smoker)    Mental illness Sibling          Allergies  Alprazolam, Bupropion hcl, and Sertraline hcl    Review of Systems   Constitutional:  Negative for chills and fever.   Respiratory:  Negative for shortness of breath.    Cardiovascular:  Negative for chest pain.   Gastrointestinal:  Negative for abdominal pain, blood in stool, nausea and vomiting.   All other systems reviewed and are negative.       Physical Exam  General: Well developed, awake/alert/oriented x3, no distress, alert and cooperative.    Skin: Warm and dry    Eyes: EOMI    Head/neck: No apparent injury    Cardiac: Regular rate and rhythm, no murmurs    Respiratory: Clear to auscultation bilaterally    GI: Nontender, mild distension, soft, no guarding    Extremities: No edema or deformity    MSK: Moving extremities w/o difficulty    Neuro: Normal gait, AxO    Psych: Appropriate mood    Last Recorded Vitals  Vitals:    10/21/24 1151   BP: 136/75   Pulse: 68   Resp: 17   Temp: 36.6 °C (97.9 °F)   SpO2: 96%                Assessment/Plan   Assessment & Plan  Stubbs's esophagus without dysplasia      EGD       I spent 15 minutes in the professional and overall care of this patient.      Ruiz Lara PA-C

## 2024-10-21 NOTE — ANESTHESIA POSTPROCEDURE EVALUATION
Patient: Sudeep Tamez    Procedure Summary       Date: 10/21/24 Room / Location: [facility]    Anesthesia Start: 1218 Anesthesia Stop: 1245    Procedure: EGD Diagnosis: Stubbs's esophagus without dysplasia    Scheduled Providers: José Miguel Viera MD Responsible Provider: KADI Tabor    Anesthesia Type: MAC ASA Status: 3            Anesthesia Type: MAC    Vitals Value Taken Time   /64 10/21/24 1246   Temp 36.3 °C (97.3 °F) 10/21/24 1236   Pulse 78 10/21/24 1246   Resp 17 10/21/24 1246   SpO2 93 % 10/21/24 1246       Anesthesia Post Evaluation    Patient location during evaluation: bedside  Patient participation: complete - patient participated  Level of consciousness: awake and alert  Pain management: adequate  Airway patency: patent  Cardiovascular status: acceptable  Respiratory status: acceptable  Hydration status: acceptable  Postoperative Nausea and Vomiting: none        No notable events documented.

## 2024-10-22 ENCOUNTER — HOSPITAL ENCOUNTER (EMERGENCY)
Facility: HOSPITAL | Age: 69
Discharge: HOME | End: 2024-10-22
Payer: COMMERCIAL

## 2024-10-22 VITALS
TEMPERATURE: 98.4 F | OXYGEN SATURATION: 98 % | HEART RATE: 62 BPM | SYSTOLIC BLOOD PRESSURE: 127 MMHG | HEIGHT: 71 IN | BODY MASS INDEX: 26.6 KG/M2 | WEIGHT: 190 LBS | DIASTOLIC BLOOD PRESSURE: 75 MMHG | RESPIRATION RATE: 17 BRPM

## 2024-10-22 DIAGNOSIS — S61.213A LACERATION OF LEFT MIDDLE FINGER WITHOUT FOREIGN BODY WITHOUT DAMAGE TO NAIL, INITIAL ENCOUNTER: Primary | ICD-10-CM

## 2024-10-22 PROCEDURE — 99283 EMERGENCY DEPT VISIT LOW MDM: CPT | Mod: 25

## 2024-10-22 PROCEDURE — 90715 TDAP VACCINE 7 YRS/> IM: CPT | Performed by: PHYSICIAN ASSISTANT

## 2024-10-22 PROCEDURE — 2500000001 HC RX 250 WO HCPCS SELF ADMINISTERED DRUGS (ALT 637 FOR MEDICARE OP): Performed by: PHYSICIAN ASSISTANT

## 2024-10-22 PROCEDURE — 12001 RPR S/N/AX/GEN/TRNK 2.5CM/<: CPT

## 2024-10-22 PROCEDURE — 90471 IMMUNIZATION ADMIN: CPT | Performed by: PHYSICIAN ASSISTANT

## 2024-10-22 PROCEDURE — 2500000004 HC RX 250 GENERAL PHARMACY W/ HCPCS (ALT 636 FOR OP/ED): Performed by: PHYSICIAN ASSISTANT

## 2024-10-22 RX ORDER — LIDOCAINE HYDROCHLORIDE 10 MG/ML
5 INJECTION, SOLUTION INFILTRATION; PERINEURAL ONCE
Status: COMPLETED | OUTPATIENT
Start: 2024-10-22 | End: 2024-10-22

## 2024-10-22 RX ORDER — CEPHALEXIN 500 MG/1
500 CAPSULE ORAL 3 TIMES DAILY
Qty: 21 CAPSULE | Refills: 0 | Status: SHIPPED | OUTPATIENT
Start: 2024-10-22 | End: 2024-10-29

## 2024-10-22 RX ORDER — BACITRACIN ZINC 500 UNIT/G
1 OINTMENT IN PACKET (EA) TOPICAL ONCE
Status: COMPLETED | OUTPATIENT
Start: 2024-10-22 | End: 2024-10-22

## 2024-10-22 RX ORDER — CEPHALEXIN 500 MG/1
500 CAPSULE ORAL ONCE
Status: COMPLETED | OUTPATIENT
Start: 2024-10-22 | End: 2024-10-22

## 2024-10-22 ASSESSMENT — PAIN DESCRIPTION - LOCATION: LOCATION: FINGER (COMMENT WHICH ONE)

## 2024-10-22 ASSESSMENT — PAIN SCALES - GENERAL
PAINLEVEL_OUTOF10: 5 - MODERATE PAIN
PAINLEVEL_OUTOF10: 0 - NO PAIN

## 2024-10-22 ASSESSMENT — PAIN DESCRIPTION - DESCRIPTORS: DESCRIPTORS: ACHING

## 2024-10-22 ASSESSMENT — COLUMBIA-SUICIDE SEVERITY RATING SCALE - C-SSRS
2. HAVE YOU ACTUALLY HAD ANY THOUGHTS OF KILLING YOURSELF?: NO
6. HAVE YOU EVER DONE ANYTHING, STARTED TO DO ANYTHING, OR PREPARED TO DO ANYTHING TO END YOUR LIFE?: NO
1. IN THE PAST MONTH, HAVE YOU WISHED YOU WERE DEAD OR WISHED YOU COULD GO TO SLEEP AND NOT WAKE UP?: NO

## 2024-10-22 ASSESSMENT — PAIN - FUNCTIONAL ASSESSMENT: PAIN_FUNCTIONAL_ASSESSMENT: 0-10

## 2024-10-22 NOTE — Clinical Note
Sudeep Franckbebeto was seen and treated in our emergency department on 10/22/2024.  He may return to work on 10/23/2024.  Patient has limited use left hand.     If you have any questions or concerns, please don't hesitate to call.      Mike Johnson PA-C

## 2024-10-22 NOTE — ED TRIAGE NOTES
Pt presents with the c/o laceration. Pt states that he smashed his 3rd digit on the L-hand on a machine at the [employer] he was working on.

## 2024-10-22 NOTE — ED PROVIDER NOTES
HPI   Chief Complaint   Patient presents with    Laceration       History of present illness: 69-year-old male complains of left middle finger laceration prior to arrival.  Patient was working at a [employer] on a machine and the door was closed on his left finger causing a crush injury.  Sustained a laceration left middle finger at the distal aspects at the ulnar aspect of the distal nail.  Says pain is 5 out of 10 aching persistent.  Denies paresthesias weakness loss of function.  Uncertain as the last tetanus shot was.  Patient bled for a few minutes but it stopped with pressure.  Denies any additional injuries.  Says the pain is superficial and not deep.    Review of systems: Constitutional, eye, ENT, cardiovascular, respiratory, gastrointestinal, genitourinary, neurologic, musculoskeletal, dermatologic, hematologic, endocrine systems were evaluated and were negative unless otherwise specified in history of present illness.    Medications: Reviewed and per nursing note.    Family history: Denies relevant medical conditions.      Physical exam:    Appearance: Well-developed, well-nourished, nontoxic-appearing, alert and oriented x3. Talking in complete sentences.    HEENT:  Head normocephalic atraumatic,    NECK:  Nml Inspection    Respiratory: Clear to auscultation    Cardiovascular: Regular rate and rhythm. Capillary refill less than 3 seconds on all extremities.    Neuro:  Oriented x 3, Speech Clear, cranial nerves grossly intact. Normal sensation to light touch in all 4 extremities. Deep tendon reflexes 2+ symmetrically in upper and lower extremities.    Musculoskeletal: Patient spontaneously moves all 4 extremities with 5/5 muscle strength.  No deep bone tenderness.    Skin: Laceration 1.5 cm in the distal left middle finger and the distal nail and ulnar aspect of the finger down to subcutaneous tissue no cyanosis, clubbing, edema, or rashes.            Patient History   Past Medical History:   Diagnosis Date     Anxiety     Depression     GERD (gastroesophageal reflux disease)     Kidney stones     Tinnitus     Left     Past Surgical History:   Procedure Laterality Date    ANKLE SURGERY Left     COLONOSCOPY      ESOPHAGOGASTRODUODENOSCOPY      LITHOTRIPSY      WISDOM TOOTH EXTRACTION       Family History   Problem Relation Name Age of Onset    Other (fell broke her hip) Mother      Mental illness Mother      Hypertension Mother      Dementia Mother      Mental illness Father      Paranoid behavior Father      Dementia Father      Other (complication from broken hip) Father      Benign prostatic hyperplasia Father      Drug abuse Sister          chemical dependency - sister passed from overdose    Other (benign ovarian tumor) Daughter      Dementia Mother's Brother Fei     Colon cancer Mother's Brother Fei     Colon cancer Maternal Grandmother      Diabetes Paternal Grandmother      Other (? in sleep thought to be MI) Paternal Grandmother      Cancer Paternal Grandfather      Lung cancer Paternal Grandfather          (smoker)    Mental illness Sibling       Social History     Tobacco Use    Smoking status: Never    Smokeless tobacco: Never   Vaping Use    Vaping status: Never Used   Substance Use Topics    Alcohol use: Yes     Comment: 2 drinks every couple days    Drug use: Never       Physical Exam   ED Triage Vitals [10/22/24 1723]   Temperature Heart Rate Respirations BP   36.9 °C (98.4 °F) 66 18 125/75      Pulse Ox Temp src Heart Rate Source Patient Position   97 % -- -- --      BP Location FiO2 (%)     -- --       Physical Exam      ED Course & MDM   Diagnoses as of 10/22/24 1844   Laceration of left middle finger without foreign body without damage to nail, initial encounter                 No data recorded     Dk Coma Scale Score: 15 (10/22/24 1810 : Julissa Phillips RN)                           Medical Decision Making  Patient presents with left middle finger injury .  Differential diagnosis of  laceration, fracture, contusion, dislocation, sprain, strain, vascular compromise, compartment syndrome.  Examination shows tenderness with normal neurovascular exam and compartments with full ROM.  No evidence of neurovascular injury or compartment syndrome.  Examination shows laceration of the middle finger and on the subcutaneous tissue.  He has full range of motion and strength.  Patient has no deep bone tenderness.    Imaging was discussed with patient.  Patient declined imaging as he feels that he is able to move his finger and believes a fracture is unlikely.  Low suspicion of fracture, but told patient cannot entirely rule it out without imaging.  Patient is declining at this time.    Laceration repair is performed.  Antibiotic prophylaxis initiated.  Tetanus shot immunization update made.    Procedure:  Laceration repair  Laceration repair was performed by physician assistant.  Patient has 1.5 cm laceration down to subcutaneous tissue of the left middle finger distal aspect and side of nailbed.  Site was cleansed with Betadine and irrigated with sterile water.  1% lidocaine without epinephrine was injected subcutaneously as a digital block using 2 mL.  After adequate analgesia site was cleansed with Betadine and irrigated with sterile water.  No foreign body seen at base of bloodless field.  2 simple interrupted sutures using 5-0 Prolene were placed with good approximation of tissue.  One of the sutures were placed through the nail into the distal subcutaneous tissue.  Sterile dressing with topical bacitracin was applied.  Patient tolerated rated procedure well.  Normal neurovascular exam after procedure.  Blood loss less than 5 mL.  Patient educated on wound care.    Patient will be discharged to home with prescription for Keflex antibiotic prophylaxis.  Patient is educated in signs and symptoms of worsening symptoms and reasons to come back to the emergency department.  Will need to follow up with  occupational medicine.  Sutures need removed in 10 to 14 days.  Patient does not report social determinants of health impacting ability to obtain care that is needed.  Patient agrees with plan.    This is a transcription.  Text was reviewed for errors, but some transcription errors may remain.  Please call for any questions.          Procedure  Procedures     Mike Johnson PA-C  10/22/24 8440

## 2024-10-25 LAB
LABORATORY COMMENT REPORT: NORMAL
PATH REPORT.COMMENTS IMP SPEC: NORMAL
PATH REPORT.FINAL DX SPEC: NORMAL
PATH REPORT.GROSS SPEC: NORMAL
PATH REPORT.TOTAL CANCER: NORMAL

## 2025-01-03 ENCOUNTER — OFFICE VISIT (OUTPATIENT)
Dept: PRIMARY CARE | Facility: CLINIC | Age: 70
End: 2025-01-03
Payer: MEDICARE

## 2025-01-03 VITALS
HEART RATE: 73 BPM | WEIGHT: 196 LBS | TEMPERATURE: 99.4 F | DIASTOLIC BLOOD PRESSURE: 80 MMHG | OXYGEN SATURATION: 96 % | BODY MASS INDEX: 27.34 KG/M2 | SYSTOLIC BLOOD PRESSURE: 130 MMHG

## 2025-01-03 DIAGNOSIS — Z13.6 ENCOUNTER FOR SCREENING FOR VASCULAR DISEASE: ICD-10-CM

## 2025-01-03 DIAGNOSIS — N52.9 ERECTILE DYSFUNCTION, UNSPECIFIED ERECTILE DYSFUNCTION TYPE: ICD-10-CM

## 2025-01-03 DIAGNOSIS — Z12.5 SCREENING FOR PROSTATE CANCER: ICD-10-CM

## 2025-01-03 DIAGNOSIS — E55.9 VITAMIN D DEFICIENCY: ICD-10-CM

## 2025-01-03 DIAGNOSIS — F41.1 GENERALIZED ANXIETY DISORDER: ICD-10-CM

## 2025-01-03 DIAGNOSIS — Z00.00 ROUTINE GENERAL MEDICAL EXAMINATION AT HEALTH CARE FACILITY: Primary | ICD-10-CM

## 2025-01-03 DIAGNOSIS — K22.70 BARRETT'S ESOPHAGUS WITHOUT DYSPLASIA: ICD-10-CM

## 2025-01-03 DIAGNOSIS — K21.9 GASTROESOPHAGEAL REFLUX DISEASE WITHOUT ESOPHAGITIS: ICD-10-CM

## 2025-01-03 PROCEDURE — 1126F AMNT PAIN NOTED NONE PRSNT: CPT | Performed by: FAMILY MEDICINE

## 2025-01-03 PROCEDURE — 3079F DIAST BP 80-89 MM HG: CPT | Performed by: FAMILY MEDICINE

## 2025-01-03 PROCEDURE — 99215 OFFICE O/P EST HI 40 MIN: CPT | Mod: 25 | Performed by: FAMILY MEDICINE

## 2025-01-03 PROCEDURE — 1124F ACP DISCUSS-NO DSCNMKR DOCD: CPT | Performed by: FAMILY MEDICINE

## 2025-01-03 PROCEDURE — 1159F MED LIST DOCD IN RCRD: CPT | Performed by: FAMILY MEDICINE

## 2025-01-03 PROCEDURE — 1170F FXNL STATUS ASSESSED: CPT | Performed by: FAMILY MEDICINE

## 2025-01-03 PROCEDURE — 99214 OFFICE O/P EST MOD 30 MIN: CPT | Performed by: FAMILY MEDICINE

## 2025-01-03 PROCEDURE — 1160F RVW MEDS BY RX/DR IN RCRD: CPT | Performed by: FAMILY MEDICINE

## 2025-01-03 PROCEDURE — G0439 PPPS, SUBSEQ VISIT: HCPCS | Performed by: FAMILY MEDICINE

## 2025-01-03 PROCEDURE — 3075F SYST BP GE 130 - 139MM HG: CPT | Performed by: FAMILY MEDICINE

## 2025-01-03 PROCEDURE — 1036F TOBACCO NON-USER: CPT | Performed by: FAMILY MEDICINE

## 2025-01-03 RX ORDER — SILDENAFIL 100 MG/1
50-100 TABLET, FILM COATED ORAL DAILY PRN
Qty: 30 TABLET | Refills: 5 | Status: SHIPPED | OUTPATIENT
Start: 2025-01-03 | End: 2026-01-03

## 2025-01-03 ASSESSMENT — ACTIVITIES OF DAILY LIVING (ADL)
GROCERY_SHOPPING: INDEPENDENT
DRESSING: INDEPENDENT
BATHING: INDEPENDENT
DOING_HOUSEWORK: INDEPENDENT
TAKING_MEDICATION: INDEPENDENT
MANAGING_FINANCES: INDEPENDENT

## 2025-01-03 ASSESSMENT — ENCOUNTER SYMPTOMS
LOSS OF SENSATION IN FEET: 0
DEPRESSION: 0
OCCASIONAL FEELINGS OF UNSTEADINESS: 0

## 2025-01-03 ASSESSMENT — PAIN SCALES - GENERAL: PAINLEVEL_OUTOF10: 0-NO PAIN

## 2025-01-03 ASSESSMENT — LIFESTYLE VARIABLES
HOW MANY STANDARD DRINKS CONTAINING ALCOHOL DO YOU HAVE ON A TYPICAL DAY: 1 OR 2
AUDIT-C TOTAL SCORE: 3
HOW OFTEN DO YOU HAVE SIX OR MORE DRINKS ON ONE OCCASION: NEVER
SKIP TO QUESTIONS 9-10: 1
HOW OFTEN DO YOU HAVE A DRINK CONTAINING ALCOHOL: 2-3 TIMES A WEEK

## 2025-01-03 ASSESSMENT — PATIENT HEALTH QUESTIONNAIRE - PHQ9
2. FEELING DOWN, DEPRESSED OR HOPELESS: NOT AT ALL
SUM OF ALL RESPONSES TO PHQ9 QUESTIONS 1 AND 2: 0
1. LITTLE INTEREST OR PLEASURE IN DOING THINGS: NOT AT ALL

## 2025-01-03 NOTE — ASSESSMENT & PLAN NOTE
Orders:    CBC and Auto Differential; Future    Comprehensive Metabolic Panel; Future    TSH with reflex to Free T4 if abnormal; Future

## 2025-01-03 NOTE — PROGRESS NOTES
Subjective   Reason for Visit: Sudeep Tamez is an 69 y.o. male here for a Medicare Wellness visit.     Past Medical, Surgical, and Family History reviewed and updated in chart.    Reviewed all medications by prescribing practitioner or clinical pharmacist (such as prescriptions, OTCs, herbal therapies and supplements) and documented in the medical record.    Here for medicare physical.      Barretts Esophagus  -Pt had EGD - pt diagnosed with barretts.  Pt is on pantoprazole.   No heartburn - resolved.     Elevated PSA  -No urinary issues.  Due for recheck.     Anxiety  -F/U: stable - doing well.    -Symptoms have been stable    -She denies current suicidal and homicidal ideation.    -Current Treatment: celexa  -Counseling:   -Previous treatment includes:                 Patient Care Team:  Rosalio Skinner DO as PCP - General (Family Medicine)     Review of Systems    Objective   Vitals:  /80 (BP Location: Right arm, Patient Position: Sitting)   Pulse 73   Temp 37.4 °C (99.4 °F) (Temporal)   Wt 88.9 kg (196 lb)   SpO2 96%   BMI 27.34 kg/m²       Physical Exam  Vitals reviewed.   Constitutional:       General: He is not in acute distress.  Cardiovascular:      Rate and Rhythm: Normal rate and regular rhythm.   Pulmonary:      Effort: Pulmonary effort is normal.      Breath sounds: No wheezing or rhonchi.   Musculoskeletal:      Right lower leg: No edema.      Left lower leg: No edema.   Lymphadenopathy:      Cervical: No cervical adenopathy.   Neurological:      Mental Status: He is alert.         Assessment & Plan  Routine general medical examination at health care facility    Orders:    1 Year Follow Up In Primary Care - Wellness Exam    CBC and Auto Differential; Future    Comprehensive Metabolic Panel; Future    Generalized anxiety disorder    Orders:    CBC and Auto Differential; Future    Comprehensive Metabolic Panel; Future    TSH with reflex to Free T4 if abnormal; Future    Gastroesophageal  reflux disease without esophagitis         Stubbs's esophagus without dysplasia    Orders:    CBC and Auto Differential; Future    Comprehensive Metabolic Panel; Future    Vitamin D deficiency    Orders:    Vitamin D 25-Hydroxy,Total (for eval of Vitamin D levels); Future    Screening for prostate cancer    Orders:    Prostate Specific Antigen; Future       Patient Instructions   Here for annual physical.  Order for blood work.  Check PSA.  Up to date on colon cancer screening.     For barretts - up to date on EGD>  Continue pantoprazole    For ED - recommend changing to sildenafil 100mg - use 1/2-1 tab as needed.      For anxiety - continue celexa.      Follow up in 6 months.

## 2025-02-05 LAB
25(OH)D3+25(OH)D2 SERPL-MCNC: 24 NG/ML (ref 30–100)
ALBUMIN SERPL-MCNC: 4.2 G/DL (ref 3.6–5.1)
ALP SERPL-CCNC: 69 U/L (ref 35–144)
ALT SERPL-CCNC: 12 U/L (ref 9–46)
ANION GAP SERPL CALCULATED.4IONS-SCNC: 8 MMOL/L (CALC) (ref 7–17)
AST SERPL-CCNC: 14 U/L (ref 10–35)
BASOPHILS # BLD AUTO: 31 CELLS/UL (ref 0–200)
BASOPHILS NFR BLD AUTO: 0.6 %
BILIRUB SERPL-MCNC: 0.4 MG/DL (ref 0.2–1.2)
BUN SERPL-MCNC: 17 MG/DL (ref 7–25)
CALCIUM SERPL-MCNC: 9.1 MG/DL (ref 8.6–10.3)
CHLORIDE SERPL-SCNC: 103 MMOL/L (ref 98–110)
CHOLEST SERPL-MCNC: 244 MG/DL
CHOLEST/HDLC SERPL: 4.6 (CALC)
CO2 SERPL-SCNC: 27 MMOL/L (ref 20–32)
CREAT SERPL-MCNC: 0.97 MG/DL (ref 0.7–1.28)
EGFRCR SERPLBLD CKD-EPI 2021: 84 ML/MIN/1.73M2
EOSINOPHIL # BLD AUTO: 83 CELLS/UL (ref 15–500)
EOSINOPHIL NFR BLD AUTO: 1.6 %
ERYTHROCYTE [DISTWIDTH] IN BLOOD BY AUTOMATED COUNT: 11.9 % (ref 11–15)
GLUCOSE SERPL-MCNC: 92 MG/DL (ref 65–99)
HCT VFR BLD AUTO: 42.1 % (ref 38.5–50)
HDLC SERPL-MCNC: 53 MG/DL
HGB BLD-MCNC: 14.1 G/DL (ref 13.2–17.1)
LDLC SERPL CALC-MCNC: 166 MG/DL (CALC)
LYMPHOCYTES # BLD AUTO: 1440 CELLS/UL (ref 850–3900)
LYMPHOCYTES NFR BLD AUTO: 27.7 %
MCH RBC QN AUTO: 31.1 PG (ref 27–33)
MCHC RBC AUTO-ENTMCNC: 33.5 G/DL (ref 32–36)
MCV RBC AUTO: 92.7 FL (ref 80–100)
MONOCYTES # BLD AUTO: 395 CELLS/UL (ref 200–950)
MONOCYTES NFR BLD AUTO: 7.6 %
NEUTROPHILS # BLD AUTO: 3250 CELLS/UL (ref 1500–7800)
NEUTROPHILS NFR BLD AUTO: 62.5 %
NONHDLC SERPL-MCNC: 191 MG/DL (CALC)
PLATELET # BLD AUTO: 299 THOUSAND/UL (ref 140–400)
PMV BLD REES-ECKER: 10.5 FL (ref 7.5–12.5)
POTASSIUM SERPL-SCNC: 4.6 MMOL/L (ref 3.5–5.3)
PROT SERPL-MCNC: 7.1 G/DL (ref 6.1–8.1)
PSA SERPL-MCNC: 4.19 NG/ML
RBC # BLD AUTO: 4.54 MILLION/UL (ref 4.2–5.8)
SODIUM SERPL-SCNC: 138 MMOL/L (ref 135–146)
TRIGL SERPL-MCNC: 118 MG/DL
TSH SERPL-ACNC: 1.51 MIU/L (ref 0.4–4.5)
WBC # BLD AUTO: 5.2 THOUSAND/UL (ref 3.8–10.8)

## 2025-02-09 DIAGNOSIS — E78.00 PURE HYPERCHOLESTEROLEMIA: Primary | ICD-10-CM

## 2025-02-09 DIAGNOSIS — R97.20 ELEVATED PSA: ICD-10-CM

## 2025-02-23 PROBLEM — R35.1 NOCTURIA: Status: ACTIVE | Noted: 2025-02-23

## 2025-05-09 DIAGNOSIS — E78.00 PURE HYPERCHOLESTEROLEMIA: ICD-10-CM

## 2025-05-09 DIAGNOSIS — R97.20 ELEVATED PSA: ICD-10-CM
